# Patient Record
Sex: FEMALE | Race: BLACK OR AFRICAN AMERICAN | NOT HISPANIC OR LATINO | Employment: UNEMPLOYED | URBAN - METROPOLITAN AREA
[De-identification: names, ages, dates, MRNs, and addresses within clinical notes are randomized per-mention and may not be internally consistent; named-entity substitution may affect disease eponyms.]

---

## 2017-03-09 ENCOUNTER — LAB VISIT (OUTPATIENT)
Dept: LAB | Facility: HOSPITAL | Age: 32
End: 2017-03-09
Attending: OBSTETRICS & GYNECOLOGY
Payer: MEDICAID

## 2017-03-09 ENCOUNTER — PROCEDURE VISIT (OUTPATIENT)
Dept: OBSTETRICS AND GYNECOLOGY | Facility: CLINIC | Age: 32
End: 2017-03-09
Payer: MEDICAID

## 2017-03-09 ENCOUNTER — OFFICE VISIT (OUTPATIENT)
Dept: OBSTETRICS AND GYNECOLOGY | Facility: CLINIC | Age: 32
End: 2017-03-09
Payer: MEDICAID

## 2017-03-09 VITALS
SYSTOLIC BLOOD PRESSURE: 118 MMHG | HEIGHT: 62 IN | WEIGHT: 124.56 LBS | BODY MASS INDEX: 22.92 KG/M2 | DIASTOLIC BLOOD PRESSURE: 70 MMHG

## 2017-03-09 DIAGNOSIS — Z32.01 POSITIVE PREGNANCY TEST: Primary | ICD-10-CM

## 2017-03-09 DIAGNOSIS — Z32.01 POSITIVE PREGNANCY TEST: ICD-10-CM

## 2017-03-09 LAB
ANION GAP SERPL CALC-SCNC: 10 MMOL/L
BASOPHILS # BLD AUTO: 0.02 K/UL
BASOPHILS NFR BLD: 0.4 %
BUN SERPL-MCNC: 14 MG/DL
CALCIUM SERPL-MCNC: 8.7 MG/DL
CHLORIDE SERPL-SCNC: 100 MMOL/L
CO2 SERPL-SCNC: 26 MMOL/L
CREAT SERPL-MCNC: 0.8 MG/DL
DIFFERENTIAL METHOD: ABNORMAL
EOSINOPHIL # BLD AUTO: 0.1 K/UL
EOSINOPHIL NFR BLD: 2.5 %
ERYTHROCYTE [DISTWIDTH] IN BLOOD BY AUTOMATED COUNT: 14.4 %
EST. GFR  (AFRICAN AMERICAN): >60 ML/MIN/1.73 M^2
EST. GFR  (NON AFRICAN AMERICAN): >60 ML/MIN/1.73 M^2
GLUCOSE SERPL-MCNC: 72 MG/DL
HCT VFR BLD AUTO: 31.3 %
HGB BLD-MCNC: 10.4 G/DL
LYMPHOCYTES # BLD AUTO: 1.8 K/UL
LYMPHOCYTES NFR BLD: 37.4 %
MCH RBC QN AUTO: 29.2 PG
MCHC RBC AUTO-ENTMCNC: 33.2 %
MCV RBC AUTO: 88 FL
MONOCYTES # BLD AUTO: 0.3 K/UL
MONOCYTES NFR BLD: 5.5 %
NEUTROPHILS # BLD AUTO: 2.5 K/UL
NEUTROPHILS NFR BLD: 54 %
PLATELET # BLD AUTO: 246 K/UL
PMV BLD AUTO: 11.4 FL
POTASSIUM SERPL-SCNC: 3.9 MMOL/L
RBC # BLD AUTO: 3.56 M/UL
SODIUM SERPL-SCNC: 136 MMOL/L
WBC # BLD AUTO: 4.71 K/UL

## 2017-03-09 PROCEDURE — 76801 OB US < 14 WKS SINGLE FETUS: CPT | Mod: 26,S$PBB,, | Performed by: OBSTETRICS & GYNECOLOGY

## 2017-03-09 PROCEDURE — 99214 OFFICE O/P EST MOD 30 MIN: CPT | Mod: TH,S$PBB,25, | Performed by: OBSTETRICS & GYNECOLOGY

## 2017-03-09 PROCEDURE — 99999 PR PBB SHADOW E&M-NEW PATIENT-LVL III: CPT | Mod: PBBFAC,,, | Performed by: OBSTETRICS & GYNECOLOGY

## 2017-03-09 PROCEDURE — 76801 OB US < 14 WKS SINGLE FETUS: CPT | Mod: PBBFAC,PN | Performed by: OBSTETRICS & GYNECOLOGY

## 2017-03-09 NOTE — MR AVS SNAPSHOT
"    [facility] Obstetrics and Gynecology  [address]  [place] [address]  Phone: [phone]                  Lauren Griggs   3/9/2017 1:15 PM   Office Visit    Description:  Female : 1985   Provider:  Tiffany Garcia MD   Department:  [facility] Obstetrics and Gynecology           Reason for Visit     Possible Pregnancy           Diagnoses this Visit        Comments    Positive pregnancy test    -  Primary            To Do List           Future Appointments        Provider Department Dept Phone    3/9/2017 3:30 PM ULTRA SOUND, OB-GYN-[facility] Obstetrics and Gynecology [phone]      Goals (5 Years of Data)     None      [facility]     [facility] Nurse Care [facility] -  Assistance  Registered nurses in the [facility] On Call Center provide clinical advisement, health education, appointment booking, and other advisory services.  Call for this free service at [phone].             Medications           Message regarding Medications     Verify the changes and/or additions to your medication regime listed below are the same as discussed with your clinician today.  If any of these changes or additions are incorrect, please notify your healthcare provider.             Verify that the below list of medications is an accurate representation of the medications you are currently taking.  If none reported, the list may be blank. If incorrect, please contact your healthcare provider. Carry this list with you in case of emergency.                Clinical Reference Information           Your Vitals Were     BP Height Weight Last Period BMI    118/70 5' 2" (1.575 m) 56.5 kg (124 lb 9 oz) 2017 22.78 kg/m2      Blood Pressure          Most Recent Value    BP  118/70      Allergies as of 3/9/2017     No Known Allergies      Immunizations Administered on Date of Encounter - 3/9/2017     None      Orders Placed During Today's Visit      Normal Orders This Visit    POCT urine " pregnancy     Urine culture     Future Labs/Procedures Expected by Expires    Basic metabolic panel  3/9/2017 5/8/2018    CBC auto differential  3/9/2017 5/8/2018    Hemoglobin Electrophoresis,Hgb A2 Reynold.  3/9/2017 5/8/2018    Hepatitis B surface antigen  3/9/2017 5/8/2018    HIV-1 and HIV-2 antibodies  3/9/2017 5/8/2018    RPR  3/9/2017 5/8/2018    Rubella antibody, IgG  3/9/2017 5/8/2018    Type & Screen  3/9/2017 5/8/2018    US OB/GYN Procedure (Viewpoint)  As directed 3/9/2018      [facility] Sign-Up     Activating your [facility] account is as easy as 1-2-3!     1) Visit [email or web address], select Sign Up Now, enter this activation code and your date of birth, then select Next.  [email or web address]  Expires: 4/23/2017  2:00 PM      2) Create a username and password to use when you visit [facility] in the future and select a security question in case you lose your password and select Next.    3) Enter your e-mail address and click Sign Up!    Additional Information  If you have questions, please e-mail [email or web address] or call [phone] to talk to our [facility] staff. Remember, [facility] is NOT to be used for urgent needs. For medical emergencies, dial 911.         Language Assistance Services     ATTENTION: Language assistance services are available, free of charge. Please call [phone].      ATENCIÓN: Si habla español, tiene a dickens disposición servicios gratuitos de asistencia lingüística. Llame al [phone].     CHÚ Ý: N?u b?n nói Ti?ng Vi?t, có các d?ch v? h? tr? ngôn ng? mi?n phí dành cho b?n. G?i s? [phone].         [facility] Obstetrics and Gynecology complies with applicable Federal civil rights laws and does not discriminate on the basis of race, color, national origin, age, disability, or sex.

## 2017-03-09 NOTE — PROGRESS NOTES
Subjective:       Patient ID: Lauren Griggs is a 31 y.o. female.    Chief Complaint:  Possible Pregnancy      History of Present Illness  HPI  Missed Menses/ Possible Pregnancy  Patient complains of amenorrhea. She believes she could be pregnant. Pregnancy is desired. Sexual Activity: single partner, contraception: none. Current symptoms also include: breast tenderness, morning sickness, nausea and positive home pregnancy test. Last period was lighter than normal.     Patient's last menstrual period was 2017.     Was planning to move to [place] to study art (but canceled plans with +upt) ;; fob in active --[employer]- in [facility] -    Still working at [employer]--  Stopped zoloft/busperinone prior to conception  Hx of c/section x 2    GYN & OB History  Patient's last menstrual period was 2017.   Date of Last Pap: No result found    OB History    Para Term  AB SAB TAB Ectopic Multiple Living   2 2 2       2      # Outcome Date GA Lbr Beto/2nd Weight Sex Delivery Anes PTL Lv   2 Term     M CS-Unspec   Y   1 Term     F CS-Unspec   Y          Review of Systems  Review of Systems   Constitutional: Negative for activity change, appetite change, chills, diaphoresis, fatigue, fever and unexpected weight change.   HENT: Negative for mouth sores and tinnitus.    Eyes: Negative for discharge and visual disturbance.   Respiratory: Negative for cough, shortness of breath and wheezing.    Cardiovascular: Negative for chest pain, palpitations and leg swelling.   Gastrointestinal: Negative for abdominal pain, bloating, blood in stool, constipation, diarrhea, nausea and vomiting.   Endocrine: Negative for diabetes, hair loss, hot flashes, hyperthyroidism and hypothyroidism.   Genitourinary: Positive for menstrual problem (+upt). Negative for decreased libido, dyspareunia, dysuria, flank pain, frequency, genital sores, hematuria, menorrhagia, pelvic pain, urgency, vaginal bleeding,  vaginal discharge, vaginal pain, dysmenorrhea, urinary incontinence, postcoital bleeding, postmenopausal bleeding and vaginal odor.   Musculoskeletal: Negative for back pain and myalgias.   Skin:  Negative for rash, no acne and hair changes.   Neurological: Negative for seizures, syncope, numbness and headaches.   Hematological: Negative for adenopathy. Does not bruise/bleed easily.   Psychiatric/Behavioral: Negative for depression and sleep disturbance. The patient is not nervous/anxious.    Breast: Negative for breast mass, breast pain, nipple discharge and skin changes          Objective:    Physical Exam:   Constitutional: She appears well-developed.     Eyes: Conjunctivae and EOM are normal. Pupils are equal, round, and reactive to light.    Neck: Normal range of motion. Neck supple.     Pulmonary/Chest: Effort normal.        Abdominal: Soft.             Musculoskeletal: Normal range of motion.       Neurological: She is alert.    Skin: Skin is warm.    Psychiatric: She has a normal mood and affect.          Assessment:     Positive pregnancy test          Plan:      Risk assessment today  Labs today  Gc/ct/pap/affirm next visit  sono ordered for dating  Continue increase water and cont prenatal vit

## 2017-03-10 ENCOUNTER — TELEPHONE (OUTPATIENT)
Dept: OBSTETRICS AND GYNECOLOGY | Facility: CLINIC | Age: 32
End: 2017-03-10

## 2017-03-10 LAB
ABO + RH BLD: NORMAL
BLD GP AB SCN CELLS X3 SERPL QL: NORMAL
HBV SURFACE AG SERPL QL IA: NEGATIVE
HGB A2 MFR BLD HPLC: 2.9 %
HGB FRACT BLD ELPH-IMP: NORMAL
HGB FRACT BLD ELPH-IMP: NORMAL
HIV 1+2 AB+HIV1 P24 AG SERPL QL IA: NEGATIVE
RPR SER QL: NORMAL
RUBV IGG SER-ACNC: 6.5 IU/ML
RUBV IGG SER-IMP: ABNORMAL

## 2017-03-10 NOTE — TELEPHONE ENCOUNTER
----- Message from Fatuma Austin sent at 3/10/2017  3:56 PM CST -----  Would like to schedule next ob appt. Please call back at [phone]. Thanks//cdb

## 2017-03-11 LAB
BACTERIA UR CULT: NORMAL
BACTERIA UR CULT: NORMAL

## 2017-04-06 ENCOUNTER — INITIAL PRENATAL (OUTPATIENT)
Dept: OBSTETRICS AND GYNECOLOGY | Facility: CLINIC | Age: 32
End: 2017-04-06
Payer: MEDICAID

## 2017-04-06 ENCOUNTER — PROCEDURE VISIT (OUTPATIENT)
Dept: OBSTETRICS AND GYNECOLOGY | Facility: CLINIC | Age: 32
End: 2017-04-06
Payer: MEDICAID

## 2017-04-06 VITALS
SYSTOLIC BLOOD PRESSURE: 100 MMHG | WEIGHT: 124.56 LBS | DIASTOLIC BLOOD PRESSURE: 60 MMHG | BODY MASS INDEX: 22.78 KG/M2

## 2017-04-06 DIAGNOSIS — Z36.86 ENCOUNTER FOR SCREENING FOR RISK OF PRE-TERM LABOR: ICD-10-CM

## 2017-04-06 DIAGNOSIS — N76.0 BACTERIAL VAGINOSIS: ICD-10-CM

## 2017-04-06 DIAGNOSIS — Z34.83 ENCOUNTER FOR SUPERVISION OF OTHER NORMAL PREGNANCY IN THIRD TRIMESTER: ICD-10-CM

## 2017-04-06 DIAGNOSIS — Z12.4 SCREENING FOR CERVICAL CANCER: Primary | ICD-10-CM

## 2017-04-06 DIAGNOSIS — B96.89 BACTERIAL VAGINOSIS: ICD-10-CM

## 2017-04-06 DIAGNOSIS — O36.80X1 EXAMINATION TO DETERMINE FETAL VIABILITY OF PREGNANCY, FETUS 1: ICD-10-CM

## 2017-04-06 DIAGNOSIS — Z11.3 SCREENING FOR GONORRHEA: ICD-10-CM

## 2017-04-06 PROCEDURE — 76801 OB US < 14 WKS SINGLE FETUS: CPT | Mod: PBBFAC,PN | Performed by: OBSTETRICS & GYNECOLOGY

## 2017-04-06 PROCEDURE — 99999 PR PBB SHADOW E&M-EST. PATIENT-LVL II: CPT | Mod: PBBFAC,,, | Performed by: OBSTETRICS & GYNECOLOGY

## 2017-04-06 PROCEDURE — 88175 CYTOPATH C/V AUTO FLUID REDO: CPT

## 2017-04-06 PROCEDURE — 99213 OFFICE O/P EST LOW 20 MIN: CPT | Mod: TH,S$PBB,, | Performed by: OBSTETRICS & GYNECOLOGY

## 2017-04-06 PROCEDURE — 76801 OB US < 14 WKS SINGLE FETUS: CPT | Mod: 26,S$PBB,, | Performed by: OBSTETRICS & GYNECOLOGY

## 2017-04-06 NOTE — PROGRESS NOTES
Reviewed edc, prenatal labs  Gc/ct/affirm/pap today  Advised zyrtec, allegra, claritin daily, nasonex spray  Cervical length/quad screen next visit  Plans to resume zoloft next visit

## 2017-04-06 NOTE — MR AVS SNAPSHOT
[facility] Obstetrics and Gynecology  [address]  [place] [address]  Phone: [phone]                  Lauren Griggs   2017 7:45 AM   Initial Prenatal    Description:  Female : 1985   Provider:  Tiffany Garcia MD   Department:  [facility] Obstetrics and Gynecology           Reason for Visit     Initial Prenatal Visit           Diagnoses this Visit        Comments    Screening for cervical cancer    -  Primary     Screening for gonorrhea         Bacterial vaginosis         Encounter for supervision of other normal pregnancy in third trimester         Examination to determine fetal viability of pregnancy, fetus 1         Encounter for screening for risk of pre-term labor                To Do List           Future Appointments        Provider Department Dept Phone    2017 9:30 AM ULTRA SOUND, OB-GYN-[facility] Obstetrics and Gynecology [phone]    2017 8:30 AM ULTRA SOUND, OB-GYN-[facility] Obstetrics and Gynecology [phone]    2017 9:15 AM Magdalena Can CNM [facility] Obstetrics [facility] Gynecology [phone]      Goals (5 Years of Data)     None      Follow-Up and Disposition     Return in about 4 weeks (around 2017) for ob visit.    Follow-up and Disposition History      [facility]     [facility] On Call Nurse Care Line -  Assistance  Unless otherwise directed by your provider, please contact [facility], our nurse care line that is available for  assistance.     Registered nurses in the [facility] Center provide: appointment scheduling, clinical advisement, health education, and other advisory services.  Call: [phone] (toll free)               Medications           Message regarding Medications     Verify the changes and/or additions to your medication regime listed below are the same as discussed with your clinician today.  If any of these changes or additions are incorrect, please notify your healthcare provider.              Verify that the below list of medications is an accurate representation of the medications you are currently taking.  If none reported, the list may be blank. If incorrect, please contact your healthcare provider. Carry this list with you in case of emergency.           Current Medications     PNV with calcium no.72-iron-FA 27 mg iron- 1 mg Tab Take 1 tablet by mouth once daily.           Clinical Reference Information           Prenatal Vitals     Enc. Date GA Prenatal Vitals Prenatal Pulse Pain Level Urine Albumin/Glucose Edema Presentation Dilation/Effacement/Station    4/6/17 13w0d 100/60 / 56.5 kg (124 lb 9 oz)   0  None / None / None / No        Your Vitals Were     BP Weight Last Period BMI       100/60 56.5 kg (124 lb 9 oz) 01/14/2017 22.78 kg/m2       Allergies as of 4/6/2017     No Known Allergies      Immunizations Administered on Date of Encounter - 4/6/2017     None      Orders Placed During Today's Visit     Future Labs/Procedures Expected by Expires    US OB/GYN Procedure (Viewpoint)  As directed 4/6/2018     OB/GYN Procedure ([facility])  As directed 4/6/2018      [facility] Sign-Up     Activating your [facility] account is as easy as 1-2-3!     1) Visit [email or web address], select Sign Up Now, enter this activation code and your date of birth, then select Next.  [email or web address]  Expires: 4/23/2017  3:00 PM      2) Create a username and password to use when you visit [facility] in the future and select a security question in case you lose your password and select Next.    3) Enter your e-mail address and click Sign Up!    Additional Information  If you have questions, please e-mail [email or web address] or call [phone] to talk to our [facility] staff. Remember, [facility] is NOT to be used for urgent needs. For medical emergencies, dial 911.         Language Assistance Services     ATTENTION: Language assistance services are available, free of charge. Please call [phone].       ATENCIÓN: Si habla español, tiene a dickens disposición servicios gratuitos de asistencia lingüística. Johny al [phone].     CHÚ Ý: N?u b?n nói Ti?ng Vi?t, có các d?ch v? h? tr? ngôn ng? mi?n phí dành cho b?n. G?i s? [phone].         [facility] Obstetrics and Gynecology complies with applicable Federal civil rights laws and does not discriminate on the basis of race, color, national origin, age, disability, or sex.

## 2017-04-07 LAB
C TRACH DNA SPEC QL NAA+PROBE: NOT DETECTED
CANDIDA RRNA VAG QL PROBE: NEGATIVE
G VAGINALIS RRNA GENITAL QL PROBE: POSITIVE
N GONORRHOEA DNA SPEC QL NAA+PROBE: NOT DETECTED
T VAGINALIS RRNA GENITAL QL PROBE: NEGATIVE

## 2017-04-08 ENCOUNTER — PATIENT MESSAGE (OUTPATIENT)
Dept: OBSTETRICS AND GYNECOLOGY | Facility: CLINIC | Age: 32
End: 2017-04-08

## 2017-04-09 ENCOUNTER — PATIENT MESSAGE (OUTPATIENT)
Dept: OBSTETRICS AND GYNECOLOGY | Facility: HOSPITAL | Age: 32
End: 2017-04-09

## 2017-04-09 DIAGNOSIS — N76.0 BACTERIAL VAGINOSIS: Primary | ICD-10-CM

## 2017-04-09 DIAGNOSIS — B96.89 BACTERIAL VAGINOSIS: Primary | ICD-10-CM

## 2017-04-09 RX ORDER — METRONIDAZOLE 500 MG/1
500 TABLET ORAL EVERY 12 HOURS
Qty: 14 TABLET | Refills: 0 | Status: SHIPPED | OUTPATIENT
Start: 2017-04-09 | End: 2017-04-16

## 2017-04-11 ENCOUNTER — PATIENT MESSAGE (OUTPATIENT)
Dept: OBSTETRICS AND GYNECOLOGY | Facility: CLINIC | Age: 32
End: 2017-04-11

## 2017-04-12 ENCOUNTER — PATIENT MESSAGE (OUTPATIENT)
Dept: OBSTETRICS AND GYNECOLOGY | Facility: CLINIC | Age: 32
End: 2017-04-12

## 2017-04-13 ENCOUNTER — PATIENT MESSAGE (OUTPATIENT)
Dept: OBSTETRICS AND GYNECOLOGY | Facility: CLINIC | Age: 32
End: 2017-04-13

## 2017-05-02 ENCOUNTER — ROUTINE PRENATAL (OUTPATIENT)
Dept: OBSTETRICS AND GYNECOLOGY | Facility: CLINIC | Age: 32
End: 2017-05-02
Payer: MEDICAID

## 2017-05-02 ENCOUNTER — PROCEDURE VISIT (OUTPATIENT)
Dept: OBSTETRICS AND GYNECOLOGY | Facility: CLINIC | Age: 32
End: 2017-05-02
Payer: MEDICAID

## 2017-05-02 VITALS — WEIGHT: 125 LBS | SYSTOLIC BLOOD PRESSURE: 102 MMHG | BODY MASS INDEX: 22.86 KG/M2 | DIASTOLIC BLOOD PRESSURE: 70 MMHG

## 2017-05-02 DIAGNOSIS — Z36.89 ENCOUNTER FOR FETAL ANATOMIC SURVEY: Primary | ICD-10-CM

## 2017-05-02 DIAGNOSIS — Z98.891 PREVIOUS CESAREAN SECTION: ICD-10-CM

## 2017-05-02 DIAGNOSIS — Z36.86 ENCOUNTER FOR SCREENING FOR RISK OF PRE-TERM LABOR: ICD-10-CM

## 2017-05-02 DIAGNOSIS — N76.0 BV (BACTERIAL VAGINOSIS): ICD-10-CM

## 2017-05-02 DIAGNOSIS — D64.9 ANEMIA, UNSPECIFIED TYPE: ICD-10-CM

## 2017-05-02 DIAGNOSIS — Z34.82 ENCOUNTER FOR SUPERVISION OF OTHER NORMAL PREGNANCY IN SECOND TRIMESTER: ICD-10-CM

## 2017-05-02 DIAGNOSIS — B96.89 BV (BACTERIAL VAGINOSIS): ICD-10-CM

## 2017-05-02 PROBLEM — Z34.92 ENCOUNTER FOR SUPERVISION OF NORMAL PREGNANCY IN SECOND TRIMESTER: Status: ACTIVE | Noted: 2017-05-02

## 2017-05-02 PROCEDURE — 99212 OFFICE O/P EST SF 10 MIN: CPT | Mod: 25,PBBFAC,PN | Performed by: OBSTETRICS & GYNECOLOGY

## 2017-05-02 PROCEDURE — 99999 PR PBB SHADOW E&M-EST. PATIENT-LVL II: CPT | Mod: 25,PBBFAC,, | Performed by: OBSTETRICS & GYNECOLOGY

## 2017-05-02 PROCEDURE — 76817 TRANSVAGINAL US OBSTETRIC: CPT | Mod: 26,52,S$PBB, | Performed by: OBSTETRICS & GYNECOLOGY

## 2017-05-02 PROCEDURE — 99213 OFFICE O/P EST LOW 20 MIN: CPT | Mod: TH,S$PBB,, | Performed by: OBSTETRICS & GYNECOLOGY

## 2017-05-02 RX ORDER — METRONIDAZOLE 7.5 MG/G
1 GEL VAGINAL NIGHTLY
Qty: 70 G | Refills: 0 | Status: SHIPPED | OUTPATIENT
Start: 2017-05-02 | End: 2017-05-07

## 2017-05-02 RX ORDER — FERROUS SULFATE 325(65) MG
325 TABLET ORAL DAILY
Qty: 30 TABLET | Refills: 3 | Status: SHIPPED | OUTPATIENT
Start: 2017-05-02 | End: 2017-05-30 | Stop reason: SDUPTHER

## 2017-05-02 NOTE — LETTER
[place] - Obstetrics and Gynecology  [address]  [place] [address]  Phone: [phone]     To Whom It May Concern,    Please note that Ms. Griggs is under our care for her pregnancy. Prolonged, stationery standing can contribute to complications in pregnancy. Advised rest periods, particularly, a stool to accommodate her sitting in her drive through position would be beneficial and advised.       With Kindest Regards,           Magdalena Can, MSN, APRN-CNM

## 2017-05-02 NOTE — PROGRESS NOTES
"US today : , breech, posterior placenta, devin normal. Cerix 55 mm.  No complaints other than fatigue.  Anemia is noted on ob labs and rx sent in for ferrous sulfate to take daily 30" prior to meal with juice in addition to PNV daily.  Is for a RCS.  Anatomy scan scheduled for next ov.  Notes quickening and other common discomforts only.   BV noted on screen and metrogel sent in to pharmacy on file per pt request.   RTC 4 wks.   "

## 2017-05-02 NOTE — MR AVS SNAPSHOT
[facility] Obstetrics and Gynecology  [address]  [place] [address]  Phone: [phone]                  Lauren Griggs   2017 9:15 AM   Routine Prenatal    Description:  Female : 1985   Provider:  Magdalena Can CNM   Department:  [facility] Obstetrics and Gynecology           Reason for Visit     Routine Prenatal Visit                To Do List           Future Appointments        Provider Department Dept Phone    2017 9:30 AM ADORE VILLALTA, OB-GYN-[facility] Obstetrics and Gynecology [phone]    2017 10:00 AM Magdalena Can CNM [facility] Obstetrics and Gynecology [phone]      Goals (5 Years of Data)     None      [facility]     [facility] On Call Nurse Care Line -  Assistance  Unless otherwise directed by your provider, please contact [facility], our nurse care line that is available for  assistance.     Registered nurses in the [facility] On Call Center provide: appointment scheduling, clinical advisement, health education, and other advisory services.  Call: [phone] (toll free)               Medications           Message regarding Medications     Verify the changes and/or additions to your medication regime listed below are the same as discussed with your clinician today.  If any of these changes or additions are incorrect, please notify your healthcare provider.             Verify that the below list of medications is an accurate representation of the medications you are currently taking.  If none reported, the list may be blank. If incorrect, please contact your healthcare provider. Carry this list with you in case of emergency.           Current Medications     PNV with calcium no.72-iron-FA 27 mg iron- 1 mg Tab Take 1 tablet by mouth once daily.           Clinical Reference Information           Prenatal Vitals     Enc. Date GA Prenatal Vitals Prenatal Pulse Pain Level Urine Albumin/Glucose Edema Presentation  Dilation/Effacement/Station    5/2/17 16w5d 102/70 / 56.7 kg (125 lb)  / 152 / Present          4/6/17 13w0d 100/60 / 56.5 kg (124 lb 9 oz)   0  None / None / None / No        Your Vitals Were     BP Weight Last Period BMI       102/70 56.7 kg (125 lb) 01/14/2017 22.86 kg/m2       Allergies as of 5/2/2017     No Known Allergies      Immunizations Administered on Date of Encounter - 5/2/2017     None      Language Assistance Services     ATTENTION: Language assistance services are available, free of charge. Please call [phone].      ATENCIÓN: Si habla marck, tiene a dickens disposición servicios gratuitos de asistencia lingüística. Llame al [phone].     CHÚ Ý: N?u b?n nói Ti?ng Vi?t, có các d?ch v? h? tr? ngôn ng? mi?n phí dành cho b?n. G?i s? [phone].         [facility] Obstetrics and Gynecology complies with applicable Federal civil rights laws and does not discriminate on the basis of race, color, national origin, age, disability, or sex.

## 2017-05-26 ENCOUNTER — HOSPITAL ENCOUNTER (EMERGENCY)
Facility: HOSPITAL | Age: 32
Discharge: HOME | End: 2017-05-26
Attending: EMERGENCY MEDICINE
Payer: MEDICAID

## 2017-05-26 ENCOUNTER — TELEPHONE (OUTPATIENT)
Dept: OBSTETRICS AND GYNECOLOGY | Facility: CLINIC | Age: 32
End: 2017-05-26

## 2017-05-26 VITALS
HEIGHT: 62 IN | HEART RATE: 88 BPM | DIASTOLIC BLOOD PRESSURE: 71 MMHG | OXYGEN SATURATION: 99 % | TEMPERATURE: 98 F | BODY MASS INDEX: 23.19 KG/M2 | SYSTOLIC BLOOD PRESSURE: 102 MMHG | WEIGHT: 126 LBS | RESPIRATION RATE: 18 BRPM

## 2017-05-26 DIAGNOSIS — Z3A.19 19 WEEKS GESTATION OF PREGNANCY: Primary | ICD-10-CM

## 2017-05-26 LAB
BACTERIA #/AREA URNS HPF: NORMAL /HPF
BILIRUB UR QL STRIP: NEGATIVE
CLARITY UR: CLEAR
COLOR UR: YELLOW
GLUCOSE UR QL STRIP: NEGATIVE
HGB UR QL STRIP: ABNORMAL
KETONES UR QL STRIP: ABNORMAL
LEUKOCYTE ESTERASE UR QL STRIP: NEGATIVE
MICROSCOPIC COMMENT: NORMAL
NITRITE UR QL STRIP: NEGATIVE
PH UR STRIP: 6 [PH] (ref 5–8)
PROT UR QL STRIP: NEGATIVE
RBC #/AREA URNS HPF: 3 /HPF (ref 0–4)
SP GR UR STRIP: 1.02 (ref 1–1.03)
SQUAMOUS #/AREA URNS HPF: 3 /HPF
URN SPEC COLLECT METH UR: ABNORMAL
UROBILINOGEN UR STRIP-ACNC: NEGATIVE EU/DL
WBC #/AREA URNS HPF: 3 /HPF (ref 0–5)

## 2017-05-26 PROCEDURE — 81000 URINALYSIS NONAUTO W/SCOPE: CPT

## 2017-05-26 PROCEDURE — 99284 EMERGENCY DEPT VISIT MOD MDM: CPT

## 2017-05-26 NOTE — TELEPHONE ENCOUNTER
----- Message from Latasha Forbes sent at 5/26/2017  7:41 AM CDT -----  Contact: pt   Pt is having abdominal pain and would like to be seen today,, there is nothing available,, pt refused other providers,, please call pt back at [phone]

## 2017-05-26 NOTE — TELEPHONE ENCOUNTER
Educated Pt about round ligament pain and ways to alleviate pain associated. Pt states that she also had some spotting. I advised her to refrain from sexual intercourse and if she starts to experience a bloodflow with cramping go to L&D. DS

## 2017-05-26 NOTE — ED NOTES
Change of caregiver. Informed MD will give results of urine. Has pain lower part of abd. No bleeding/back pain

## 2017-05-26 NOTE — ED PROVIDER NOTES
SCRIBE #1 NOTE: I, Manuel Ladd, am scribing for, and in the presence of, Chuck Jamison MD. I have scribed the entire note.      History      Chief Complaint   Patient presents with    Abdominal Pain     Pt reports she is 19 weeks pregnant and is having suprapubic pain that is cramping. pt also reports spotting since this morning        Review of patient's allergies indicates:  No Known Allergies     HPI   HPI    2017, 1:07 PM   History obtained from the patient      History of Present Illness: Lauren Griggs is a 31 y.o. female patient who presents to the Emergency Department for lower abd pain which onset gradually today. Symptoms are intermittent and moderate in severity. Sx are exacerbated by nothing and relieved by nothing. Pt states the pain comes on every few seconds. Associated sxs include vaginal spotting onset this AM. No other sxs reported. Pt states she is 19 weeks pregnant. Pt states her next OB/GYN appointment is in 4 days. Pt states she tried to see her OB/GYN physician today but she was booked so she was instructed to reported to the ED. Patient denies any fever, N/V/D, chills, vaginal discharge, constipation, dysuria, difficulty urinating, CP, SOB, vaginal pain and all other sxs at this time. No further complaints or concerns at this time.     Arrival mode: Personal vehicle     PCP: Primary Doctor No       Past Medical History:  Past Medical History:   Diagnosis Date    Abnormal Pap smear of cervix        Past Surgical History:  Past Surgical History:   Procedure Laterality Date     SECTION           Family History:  No family history on file.    Social History:  Social History     Social History Main Topics    Smoking status: Never Smoker    Smokeless tobacco: Not on file    Alcohol use No      Comment: socally     Drug use: No    Sexual activity: Yes     Partners: Male       ROS   Review of Systems   Constitutional: Negative for chills and fever.   HENT: Negative  for congestion and sore throat.    Respiratory: Negative for chest tightness and shortness of breath.    Cardiovascular: Negative for chest pain.   Gastrointestinal: Positive for abdominal pain (lower). Negative for constipation, diarrhea, nausea and vomiting.   Genitourinary: Positive for vaginal bleeding (spotting). Negative for difficulty urinating and dysuria.   Musculoskeletal: Negative for back pain and neck pain.   Skin: Negative for rash.   Neurological: Negative for dizziness, numbness and headaches.   Psychiatric/Behavioral: Negative for agitation and confusion.   All other systems reviewed and are negative.      Physical Exam      Initial Vitals [05/26/17 1247]   BP Pulse Resp Temp SpO2   106/66 100 20 98.2 °F (36.8 °C) 98 %      Physical Exam  Nursing Notes and Vital Signs Reviewed.  Constitutional: Patient is in no apparent distress. Well-developed and well-nourished.  Head: Atraumatic. Normocephalic.  Eyes: PERRL. EOM intact. Conjunctivae are not pale. No scleral icterus.  ENT: Mucous membranes are moist. Oropharynx is clear and symmetric.    Neck: Supple. Full ROM. No lymphadenopathy.  Cardiovascular: Regular rate. Regular rhythm. No murmurs, rubs, or gallops. Distal pulses are 2+ and symmetric.  Pulmonary/Chest: No respiratory distress. Clear to auscultation bilaterally. No wheezing, rales, or rhonchi.  Abdominal: Soft and non-distended. Gravid uterus noted. There is no tenderness.  No rebound, guarding, or rigidity. Good bowel sounds.  Pelvic: A female chaperone was present for this examination. Nl external inspection. No lesions or abnormalities were visible on the labia majora or minora. Cervical os is closed. There is no CMT. Mild old blood noted in the vaginal vault, no active bleeding. No discharge. No adnexal tenderness. No adnexal masses.  Musculoskeletal: Moves all extremities. No obvious deformities. No edema. No calf tenderness.  Skin: Warm and dry.  Neurological:  Alert, awake, and  "appropriate.  Normal speech.  No acute focal neurological deficits are appreciated.  Psychiatric: Normal affect. Good eye contact. Appropriate in content.    ED Course    Procedures  ED Vital Signs:  Vitals:    05/26/17 1247 05/26/17 1502   BP: 106/66 102/71   Pulse: 100 88   Resp: 20 18   Temp: 98.2 °F (36.8 °C) 98.3 °F (36.8 °C)   TempSrc: Oral    SpO2: 98% 99%   Weight: 57.2 kg (126 lb)    Height: 5' 2" (1.575 m)        Abnormal Lab Results:  Labs Reviewed   URINALYSIS - Abnormal; Notable for the following:        Result Value    Ketones, UA 1+ (*)     Occult Blood UA 3+ (*)     All other components within normal limits   URINALYSIS MICROSCOPIC        All Lab Results:  Results for orders placed or performed during the hospital encounter of 05/26/17   Urinalysis   Result Value Ref Range    Specimen UA Urine, Clean Catch     Color, UA Yellow Yellow, Straw, Lauren    Appearance, UA Clear Clear    pH, UA 6.0 5.0 - 8.0    Specific Gravity, UA 1.025 1.005 - 1.030    Protein, UA Negative Negative    Glucose, UA Negative Negative    Ketones, UA 1+ (A) Negative    Bilirubin (UA) Negative Negative    Occult Blood UA 3+ (A) Negative    Nitrite, UA Negative Negative    Urobilinogen, UA Negative <2.0 EU/dL    Leukocytes, UA Negative Negative   Urinalysis Microscopic   Result Value Ref Range    RBC, UA 3 0 - 4 /hpf    WBC, UA 3 0 - 5 /hpf    Bacteria, UA Occasional None-Occ /hpf    Squam Epithel, UA 3 /hpf    Microscopic Comment SEE COMMENT               The Emergency Provider reviewed the vital signs and test results, which are outlined above.    ED Discussion     1:19 PM: Bedside US showed positive IUP, god fetal heart tones, and good fetal movement.    3:14 PM: Reassessed pt at this time. Pt is laying comfortably in ED bed and in NAD. Pt is awake, alert, and oriented. Instructed pt to f/u with her OB/GYN and take tylenol for the intermittent abd pain. Discussed with pt all pertinent ED information and results. Discussed pt " dx and plan of tx. Gave pt all f/u and return to the ED instructions. All questions and concerns were addressed at this time. Pt expresses understanding of information and instructions, and is comfortable with plan to discharge. Pt is stable for discharge.    I discussed with patient and/or family/caretaker that evaluation in the ED does not suggest any emergent or life threatening medical conditions requiring immediate intervention beyond what was provided in the ED, and I believe patient is safe for discharge.  Regardless, an unremarkable evaluation in the ED does not preclude the development or presence of a serious of life threatening condition. As such, patient was instructed to return immediately for any worsening or change in current symptoms.        ED Medication(s):  Medications - No data to display    Discharge Medication List as of 5/26/2017  3:07 PM          Follow-up Information     [facility] in 2 days.    Contact information:  [address] [facility] [address] [address] [phone]                     Medical Decision Making    Medical Decision Making:   Clinical Tests:   Lab Tests: Reviewed and Ordered           Scribe Attestation:   Scribe #1: I performed the above scribed service and the documentation accurately describes the services I performed. I attest to the accuracy of the note.    Attending:   Physician Attestation Statement for Scribe #1: I, Chuck Jamison MD, personally performed the services described in this documentation, as scribed by Manuel Ladd, in my presence, and it is both accurate and complete.          Clinical Impression       ICD-10-CM ICD-9-CM   1. 19 weeks gestation of pregnancy Z3A.19 V22.2       Disposition:   Disposition: Discharged  Condition: Stable         Chuck Jamison MD  05/26/17 6619

## 2017-05-30 ENCOUNTER — PROCEDURE VISIT (OUTPATIENT)
Dept: OBSTETRICS AND GYNECOLOGY | Facility: CLINIC | Age: 32
End: 2017-05-30
Payer: MEDICAID

## 2017-05-30 ENCOUNTER — ROUTINE PRENATAL (OUTPATIENT)
Dept: OBSTETRICS AND GYNECOLOGY | Facility: CLINIC | Age: 32
End: 2017-05-30
Payer: MEDICAID

## 2017-05-30 VITALS — DIASTOLIC BLOOD PRESSURE: 61 MMHG | SYSTOLIC BLOOD PRESSURE: 121 MMHG | WEIGHT: 127 LBS | BODY MASS INDEX: 23.23 KG/M2

## 2017-05-30 DIAGNOSIS — D25.1 FIBROIDS, INTRAMURAL: ICD-10-CM

## 2017-05-30 DIAGNOSIS — D25.9 UTERINE FIBROID IN PREGNANCY: ICD-10-CM

## 2017-05-30 DIAGNOSIS — D64.9 ANEMIA, UNSPECIFIED TYPE: ICD-10-CM

## 2017-05-30 DIAGNOSIS — O09.292 HISTORY OF FETAL ANOMALY IN PRIOR PREGNANCY, CURRENTLY PREGNANT IN SECOND TRIMESTER: ICD-10-CM

## 2017-05-30 DIAGNOSIS — Z3A.20 20 WEEKS GESTATION OF PREGNANCY: Primary | ICD-10-CM

## 2017-05-30 DIAGNOSIS — Z36.89 ENCOUNTER FOR FETAL ANATOMIC SURVEY: ICD-10-CM

## 2017-05-30 DIAGNOSIS — O34.10 UTERINE FIBROID IN PREGNANCY: ICD-10-CM

## 2017-05-30 DIAGNOSIS — D50.9 IRON DEFICIENCY ANEMIA, UNSPECIFIED IRON DEFICIENCY ANEMIA TYPE: ICD-10-CM

## 2017-05-30 PROCEDURE — 76805 OB US >/= 14 WKS SNGL FETUS: CPT | Mod: 26,S$PBB,, | Performed by: OBSTETRICS & GYNECOLOGY

## 2017-05-30 PROCEDURE — 99999 PR PBB SHADOW E&M-EST. PATIENT-LVL III: CPT | Mod: PBBFAC,,, | Performed by: OBSTETRICS & GYNECOLOGY

## 2017-05-30 PROCEDURE — 99213 OFFICE O/P EST LOW 20 MIN: CPT | Mod: PBBFAC,PN | Performed by: OBSTETRICS & GYNECOLOGY

## 2017-05-30 PROCEDURE — 99213 OFFICE O/P EST LOW 20 MIN: CPT | Mod: TH,S$PBB,, | Performed by: OBSTETRICS & GYNECOLOGY

## 2017-05-30 RX ORDER — FERROUS SULFATE 325(65) MG
325 TABLET ORAL DAILY
Qty: 30 TABLET | Refills: 3 | Status: SHIPPED | OUTPATIENT
Start: 2017-05-30 | End: 2018-04-13

## 2017-06-02 ENCOUNTER — HOSPITAL ENCOUNTER (EMERGENCY)
Facility: HOSPITAL | Age: 32
Discharge: HOME | End: 2017-06-02
Attending: EMERGENCY MEDICINE
Payer: MEDICAID

## 2017-06-02 VITALS
OXYGEN SATURATION: 98 % | WEIGHT: 127 LBS | RESPIRATION RATE: 18 BRPM | BODY MASS INDEX: 23.37 KG/M2 | TEMPERATURE: 98 F | HEART RATE: 91 BPM | HEIGHT: 62 IN | DIASTOLIC BLOOD PRESSURE: 55 MMHG | SYSTOLIC BLOOD PRESSURE: 90 MMHG

## 2017-06-02 DIAGNOSIS — R55 SYNCOPE: ICD-10-CM

## 2017-06-02 DIAGNOSIS — R55 SYNCOPE, UNSPECIFIED SYNCOPE TYPE: Primary | ICD-10-CM

## 2017-06-02 LAB
ALBUMIN SERPL BCP-MCNC: 3.6 G/DL
ALP SERPL-CCNC: 77 U/L
ALT SERPL W/O P-5'-P-CCNC: 14 U/L
ANION GAP SERPL CALC-SCNC: 14 MMOL/L
AST SERPL-CCNC: 18 U/L
BASOPHILS # BLD AUTO: 0.01 K/UL
BASOPHILS NFR BLD: 0.1 %
BILIRUB SERPL-MCNC: 0.3 MG/DL
BUN SERPL-MCNC: 10 MG/DL
CALCIUM SERPL-MCNC: 9.3 MG/DL
CHLORIDE SERPL-SCNC: 101 MMOL/L
CO2 SERPL-SCNC: 21 MMOL/L
CREAT SERPL-MCNC: 0.8 MG/DL
DIFFERENTIAL METHOD: ABNORMAL
EOSINOPHIL # BLD AUTO: 0.3 K/UL
EOSINOPHIL NFR BLD: 2.8 %
ERYTHROCYTE [DISTWIDTH] IN BLOOD BY AUTOMATED COUNT: 13.7 %
EST. GFR  (AFRICAN AMERICAN): >60 ML/MIN/1.73 M^2
EST. GFR  (NON AFRICAN AMERICAN): >60 ML/MIN/1.73 M^2
GLUCOSE SERPL-MCNC: 73 MG/DL
HCT VFR BLD AUTO: 35.4 %
HGB BLD-MCNC: 12.1 G/DL
LYMPHOCYTES # BLD AUTO: 1.6 K/UL
LYMPHOCYTES NFR BLD: 18.3 %
MCH RBC QN AUTO: 30.6 PG
MCHC RBC AUTO-ENTMCNC: 34.2 %
MCV RBC AUTO: 90 FL
MONOCYTES # BLD AUTO: 0.4 K/UL
MONOCYTES NFR BLD: 4.6 %
NEUTROPHILS # BLD AUTO: 6.6 K/UL
NEUTROPHILS NFR BLD: 74.2 %
PLATELET # BLD AUTO: 245 K/UL
PMV BLD AUTO: 11.1 FL
POTASSIUM SERPL-SCNC: 3.8 MMOL/L
PROT SERPL-MCNC: 10 G/DL
RBC # BLD AUTO: 3.95 M/UL
SODIUM SERPL-SCNC: 136 MMOL/L
WBC # BLD AUTO: 8.84 K/UL

## 2017-06-02 PROCEDURE — 93010 ELECTROCARDIOGRAM REPORT: CPT | Mod: ,,, | Performed by: INTERNAL MEDICINE

## 2017-06-02 PROCEDURE — 25000003 PHARM REV CODE 250: Performed by: NURSE PRACTITIONER

## 2017-06-02 PROCEDURE — 85025 COMPLETE CBC W/AUTO DIFF WBC: CPT

## 2017-06-02 PROCEDURE — 93005 ELECTROCARDIOGRAM TRACING: CPT

## 2017-06-02 PROCEDURE — 96360 HYDRATION IV INFUSION INIT: CPT

## 2017-06-02 PROCEDURE — 80053 COMPREHEN METABOLIC PANEL: CPT

## 2017-06-02 PROCEDURE — 99283 EMERGENCY DEPT VISIT LOW MDM: CPT | Mod: 25

## 2017-06-02 RX ORDER — SODIUM CHLORIDE 9 MG/ML
1000 INJECTION, SOLUTION INTRAVENOUS
Status: COMPLETED | OUTPATIENT
Start: 2017-06-02 | End: 2017-06-02

## 2017-06-02 RX ADMIN — SODIUM CHLORIDE 1000 ML: 0.9 INJECTION, SOLUTION INTRAVENOUS at 02:06

## 2017-06-02 NOTE — ED PROVIDER NOTES
"SCRIBE #1 NOTE: I, Manuel Ladd, am scribing for, and in the presence of, Aaron Cervantes NP. I have scribed the entire note.      History      Chief Complaint   Patient presents with    Dizziness     reports "overheated" and near syncopal episode. 5mo preg.        Review of patient's allergies indicates:  No Known Allergies     HPI   HPI    2017, 2:01 PM   History obtained from the patient      History of Present Illness: Lauren Griggs is a 31 y.o. 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  Pt states she felt generally weak and dizzy causing her to lower herself to the ground "almost passing out." Symptoms are episodic and moderate in severity. Sx are exacerbated by nothing and relieved by nothing. Pt states "she just got over heated." Pt states she is 5 months pregnant and she has been feeling the baby move since this episode occurred. Pt is denying any other sxs at this time. Patient denies any fever, N/V/D, chills, CP, SOB, palpitations, numbness, extremity weakness, abd pain, vaginal bleeding/discharge, lightheadedness and all other sxs at this time. No further complaints or concerns at this time.     Arrival mode: Personal vehicle     PCP: Primary Doctor No       Past Medical History:  Past Medical History:   Diagnosis Date    Abnormal Pap smear of cervix        Past Surgical History:  Past Surgical History:   Procedure Laterality Date     SECTION           Family History:  History reviewed. No pertinent family history.    Social History:  Social History     Social History Main Topics    Smoking status: Never Smoker    Smokeless tobacco: Not on file    Alcohol use No      Comment: socally     Drug use: No    Sexual activity: Yes     Partners: Male       ROS   Review of Systems   Constitutional: Negative for chills and fever.   HENT: Negative for congestion " and sore throat.    Respiratory: Negative for chest tightness and shortness of breath.    Cardiovascular: Negative for chest pain.   Gastrointestinal: Negative for abdominal pain, nausea and vomiting.   Musculoskeletal: Negative for back pain and neck pain.   Skin: Negative for rash.   Neurological: Positive for dizziness. Negative for numbness and headaches.        (+)near syncope   Psychiatric/Behavioral: Negative for agitation and confusion.   All other systems reviewed and are negative.      Physical Exam      Initial Vitals [06/02/17 1258]   BP Pulse Resp Temp SpO2   111/68 85 18 98.3 °F (36.8 °C) 98 %      Physical Exam  Nursing Notes and Vital Signs Reviewed.  Constitutional: Patient is in no apparent distress. Well-developed and well-nourished.  Head: Atraumatic. Normocephalic.  Eyes: PERRL. EOM intact. Conjunctivae are not pale. No scleral icterus.  ENT: Mucous membranes are moist. Oropharynx is clear and symmetric.    Neck: Supple. Full ROM. No lymphadenopathy.  Cardiovascular: Regular rate. Regular rhythm. No murmurs, rubs, or gallops. Distal pulses are 2+ and symmetric.  Pulmonary/Chest: No respiratory distress. Clear to auscultation bilaterally. No wheezing, rales, or rhonchi.  Abdominal: Soft and non-distended. Gravid uterus noted. There is no tenderness.  No rebound, guarding, or rigidity. Good bowel sounds.  Musculoskeletal: Moves all extremities. No obvious deformities. No edema. No calf tenderness.  Skin: Warm and dry.  Neurological:  Alert, awake, and appropriate.  Normal speech. Light touch sense is intact. No acute focal neurological deficits are appreciated.  Psychiatric: Normal affect. Good eye contact. Appropriate in content.    ED Course    Procedures  ED Vital Signs:  Vitals:    06/02/17 1224 06/02/17 1258 06/02/17 1422 06/02/17 1426   BP: (!) 86/62 111/68 (!) 89/62 (!) 90/55   Pulse: 85 85 88 91   Resp:  18     Temp:  98.3 °F (36.8 °C)     TempSrc:  Oral     SpO2:  98%     Weight:  57.6 kg  "(127 lb)     Height:  5' 2" (1.575 m)      06/02/17 1527   BP:    Pulse:    Resp:    Temp: 98.2 °F (36.8 °C)   TempSrc: Oral   SpO2:    Weight:    Height:        Abnormal Lab Results:  Labs Reviewed   CBC W/ AUTO DIFFERENTIAL - Abnormal; Notable for the following:        Result Value    RBC 3.95 (*)     Hematocrit 35.4 (*)     Gran% 74.2 (*)     All other components within normal limits   COMPREHENSIVE METABOLIC PANEL - Abnormal; Notable for the following:     CO2 21 (*)     Total Protein 10.0 (*)     All other components within normal limits        All Lab Results:  Results for orders placed or performed during the hospital encounter of 06/02/17   CBC auto differential   Result Value Ref Range    WBC 8.84 3.90 - 12.70 K/uL    RBC 3.95 (L) 4.00 - 5.40 M/uL    Hemoglobin 12.1 12.0 - 16.0 g/dL    Hematocrit 35.4 (L) 37.0 - 48.5 %    MCV 90 82 - 98 fL    MCH 30.6 27.0 - 31.0 pg    MCHC 34.2 32.0 - 36.0 %    RDW 13.7 11.5 - 14.5 %    Platelets 245 150 - 350 K/uL    MPV 11.1 9.2 - 12.9 fL    Gran # 6.6 1.8 - 7.7 K/uL    Lymph # 1.6 1.0 - 4.8 K/uL    Mono # 0.4 0.3 - 1.0 K/uL    Eos # 0.3 0.0 - 0.5 K/uL    Baso # 0.01 0.00 - 0.20 K/uL    Gran% 74.2 (H) 38.0 - 73.0 %    Lymph% 18.3 18.0 - 48.0 %    Mono% 4.6 4.0 - 15.0 %    Eosinophil% 2.8 0.0 - 8.0 %    Basophil% 0.1 0.0 - 1.9 %    Differential Method Automated    Comprehensive metabolic panel   Result Value Ref Range    Sodium 136 136 - 145 mmol/L    Potassium 3.8 3.5 - 5.1 mmol/L    Chloride 101 95 - 110 mmol/L    CO2 21 (L) 23 - 29 mmol/L    Glucose 73 70 - 110 mg/dL    BUN, Bld 10 6 - 20 mg/dL    Creatinine 0.8 0.5 - 1.4 mg/dL    Calcium 9.3 8.7 - 10.5 mg/dL    Total Protein 10.0 (H) 6.0 - 8.4 g/dL    Albumin 3.6 3.5 - 5.2 g/dL    Total Bilirubin 0.3 0.1 - 1.0 mg/dL    Alkaline Phosphatase 77 55 - 135 U/L    AST 18 10 - 40 U/L    ALT 14 10 - 44 U/L    Anion Gap 14 8 - 16 mmol/L    eGFR if African American >60 >60 mL/min/1.73 m^2    eGFR if non  >60 >60 " mL/min/1.73 m^2            The EKG was ordered, reviewed, and independently interpreted by the ED provider.  Interpretation time: 1409  Rate: 82 BPM  Rhythm: Sinus rhythm with marked sinus arrhythmia  Interpretation: Otherwise normal EKG. No STEMI.         The Emergency Provider reviewed the vital signs and test results, which are outlined above.    ED Discussion     3:14 PM: Reassessed pt at this time.  Pt is laying comfortably in ED bed and in NAD. Pt is awake, alert, and oriented. Discussed with pt all pertinent ED information and results. Discussed pt dx and plan of tx. Gave pt all f/u and return to the ED instructions. All questions and concerns were addressed at this time. Pt expresses understanding of information and instructions, and is comfortable with plan to discharge. Pt is stable for discharge.    3:29 PM: Per nurse, fetal heart tones 156.    Patient presents with a syncopal or near-syncopal episode. I have no suspicion that the event is secondary to an arrhythmia such as WPW, prolonged QT syndrome, or ventricular tachycardia. I have no suspicion for a seizure episode, intracranial hemorrhage, pulmonary embolus, myocardial infarction, sepsis, ectopic pregnancy, hemorrhagic shock, or hypoglycemia. Based on my evaluation, there is no emergent medical condition. Syncope precautions were discussed with the patient and/or caretaker, specifically not to swim or bathe unattended, not to operate motor vehicles or other machinery, and to avoid heights or other areas where falls may occur until cleared by primary care physician. Patient is safe for discharge.        ED Medication(s):  Medications   0.9%  NaCl infusion (0 mLs Intravenous Stopped 6/2/17 1526)       Discharge Medication List as of 6/2/2017  3:14 PM          Follow-up Information     Primary Doctor No. Schedule an appointment as soon as possible for a visit in 2 days.                   Medical Decision Making    Medical Decision Making:   Clinical  Tests:   Lab Tests: Reviewed and Ordered  Medical Tests: Reviewed and Ordered           Scribe Attestation:   Scribe #1: I performed the above scribed service and the documentation accurately describes the services I performed. I attest to the accuracy of the note.    Attending:   Physician Attestation Statement for Scribe #1: I, Aaron Cervantes NP, personally performed the services described in this documentation, as scribed by Manuel Ladd, in my presence, and it is both accurate and complete.          Clinical Impression       ICD-10-CM ICD-9-CM   1. Syncope, unspecified syncope type R55 780.2   2. Syncope R55 780.2       Disposition:   Disposition: Discharged  Condition: Stable         Aaron Cervantes NP  06/11/17 1203

## 2017-06-28 ENCOUNTER — ROUTINE PRENATAL (OUTPATIENT)
Dept: OBSTETRICS AND GYNECOLOGY | Facility: CLINIC | Age: 32
End: 2017-06-28
Payer: MEDICAID

## 2017-06-28 ENCOUNTER — OFFICE VISIT (OUTPATIENT)
Dept: OBSTETRICS AND GYNECOLOGY | Facility: CLINIC | Age: 32
End: 2017-06-28
Payer: MEDICAID

## 2017-06-28 VITALS — WEIGHT: 136 LBS | BODY MASS INDEX: 24.87 KG/M2 | SYSTOLIC BLOOD PRESSURE: 122 MMHG | DIASTOLIC BLOOD PRESSURE: 70 MMHG

## 2017-06-28 DIAGNOSIS — O09.292 HISTORY OF FETAL ANOMALY IN PRIOR PREGNANCY, CURRENTLY PREGNANT IN SECOND TRIMESTER: ICD-10-CM

## 2017-06-28 DIAGNOSIS — D82.1 DIGEORGE SYNDROME: ICD-10-CM

## 2017-06-28 DIAGNOSIS — Z34.82 ENCOUNTER FOR SUPERVISION OF OTHER NORMAL PREGNANCY IN SECOND TRIMESTER: Primary | ICD-10-CM

## 2017-06-28 PROCEDURE — 76805 OB US >/= 14 WKS SNGL FETUS: CPT | Mod: 26,S$PBB,, | Performed by: OBSTETRICS & GYNECOLOGY

## 2017-06-28 PROCEDURE — 99999 PR PBB SHADOW E&M-EST. PATIENT-LVL II: CPT | Mod: PBBFAC,,, | Performed by: ADVANCED PRACTICE MIDWIFE

## 2017-06-28 PROCEDURE — 99213 OFFICE O/P EST LOW 20 MIN: CPT | Mod: S$PBB,TH,25, | Performed by: OBSTETRICS & GYNECOLOGY

## 2017-06-28 PROCEDURE — 99212 OFFICE O/P EST SF 10 MIN: CPT | Mod: PBBFAC,PO | Performed by: ADVANCED PRACTICE MIDWIFE

## 2017-06-28 NOTE — PROGRESS NOTES
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 [id number] 24w6d EGA    PMH:  Past Medical History:   Diagnosis Date    Abnormal Pap smear of cervix        PObHx:  OB History    Para Term  AB Living   3 2 2     2   SAB TAB Ectopic Multiple Live Births           2      # Outcome Date GA Lbr Beto/2nd Weight Sex Delivery Anes PTL Lv   3 Current            2 Term     M CS-Unspec   [place]   1 Term     F CS-Unspec   [place]          PSH:  Past Surgical History:   Procedure Laterality Date     SECTION         Family history: Son with Di Herman syndrome    Social history: reports that she has never smoked. She does not have any smokeless tobacco history on file. She reports that she does not drink alcohol or use drugs.    A detailed fetal anatomical ultrasound was completed today.  See details in imaging section of [facility].    Interestingly, in obtaining a history from the subject, she knows her child has DiGeorge syndrome and states she is a carrier but does not know of what.  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  Because of this 50% of her children will inherit the condition.  She was informed of this.      On the US today we do see a cardiac defect suspicious for a conotruncal defect.  This is highly suggestive that this fetus has inherited the condition.        DiGeorge syndrome, also known as velocardiofacial syndrome (VCFS) is caused by a deletion at chromosome 22q11.2. Affected individuals can have a range of findings including congenital heart disease (74% of individuals), particularly conotruncal malformations (tetralogy of Fallot, interrupted aortic arch, ventricular septal defect, and truncus arteriosis); palatal abnormalities (69%), characteristic facial features; learning difficulties (70-90%); immune deficiency, hypocalcemia; feeding difficulty, renal anomalies, hearing loss, growth hormone deficiency, autoimmune disorders, seizures  and skeletal abnormalities. We discussed that the majority of affected individuals (93%) represent de brittany mutations, however, the remaining 7% have inherited the deletion from a parent. Parents are often very mildly affected and may have never been previously diagnosed. We discussed that if either she or the baby's father has the deletion, the risk for them to have another affected child is 50%. If neither of them have the deletion, the risk for them to have an  affected child is the same as the population risk of approximately 1/4,000. She verbalized understanding.    The approximate physician face-to-face time was 15 minutes. The majority of the time (>50%) was spent on counseling of the patient or coordination of care.    We have scheduled her for MFM consult, US and fetal echocardiogram at [facility] in the near future.

## 2017-06-29 NOTE — PROGRESS NOTES
US reviewed- FU [facility] Echocardiogram and US at [facility]- pt to be called to arranged  28 week lab and visit in 2 weeks in [place]    Coffective counseling sheet Fall In Love discussed with mother. Reinforced immediate skin to skin, the magic first hour, importance of the first feeding and delaying routine procedures. Encouraged mother to download Coffective mobile hailee if she has not already done so. Mother verbalizes understanding.    Coffective counseling sheet Get Ready discussed with mother. Reinforced avoiding induction of labor unless medically indicated as well as comfort measures during labor.  Encouraged mother to download Coffective mobile hailee if she has not already done so. Mother verbalizes understanding.

## 2017-07-05 ENCOUNTER — PATIENT MESSAGE (OUTPATIENT)
Dept: OBSTETRICS AND GYNECOLOGY | Facility: CLINIC | Age: 32
End: 2017-07-05

## 2017-07-05 ENCOUNTER — TELEPHONE (OUTPATIENT)
Dept: OBSTETRICS AND GYNECOLOGY | Facility: CLINIC | Age: 32
End: 2017-07-05

## 2017-07-05 DIAGNOSIS — O35.9XX1 FETAL ABNORMALITY AFFECTING MANAGEMENT OF MOTHER, FETUS 1: Primary | ICD-10-CM

## 2017-07-05 NOTE — TELEPHONE ENCOUNTER
----- Message from Elizabeth Feliciano sent at 7/5/2017  9:52 AM CDT -----  Contact: [phone]  Patient returning call. Please call back at [phone].      Thanks,  Elizabeth Feliciano

## 2017-07-07 ENCOUNTER — CLINICAL SUPPORT (OUTPATIENT)
Dept: PEDIATRIC CARDIOLOGY | Facility: CLINIC | Age: 32
End: 2017-07-07
Attending: PEDIATRICS
Payer: MEDICAID

## 2017-07-07 ENCOUNTER — OFFICE VISIT (OUTPATIENT)
Dept: MATERNAL FETAL MEDICINE | Facility: CLINIC | Age: 32
End: 2017-07-07
Attending: OBSTETRICS & GYNECOLOGY
Payer: MEDICAID

## 2017-07-07 VITALS
WEIGHT: 139.31 LBS | DIASTOLIC BLOOD PRESSURE: 68 MMHG | SYSTOLIC BLOOD PRESSURE: 104 MMHG | HEART RATE: 100 BPM | HEIGHT: 62 IN | BODY MASS INDEX: 25.64 KG/M2

## 2017-07-07 DIAGNOSIS — Q20.0 TRUNCUS ARTERIOSUS, EDWARDS' TYPE II: ICD-10-CM

## 2017-07-07 DIAGNOSIS — O35.9XX1 FETAL ABNORMALITY AFFECTING MANAGEMENT OF MOTHER, FETUS 1: ICD-10-CM

## 2017-07-07 DIAGNOSIS — D82.1 DIGEORGE SYNDROME: Primary | ICD-10-CM

## 2017-07-07 PROCEDURE — 99203 OFFICE O/P NEW LOW 30 MIN: CPT | Mod: 25,S$PBB,, | Performed by: PEDIATRICS

## 2017-07-07 PROCEDURE — 76827 ECHO EXAM OF FETAL HEART: CPT | Mod: PBBFAC | Performed by: PEDIATRICS

## 2017-07-07 PROCEDURE — 99499 UNLISTED E&M SERVICE: CPT | Mod: S$PBB,,, | Performed by: OBSTETRICS & GYNECOLOGY

## 2017-07-07 PROCEDURE — 99999 PR PBB SHADOW E&M-EST. PATIENT-LVL III: CPT | Mod: PBBFAC,,, | Performed by: PEDIATRICS

## 2017-07-07 PROCEDURE — 93325 DOPPLER ECHO COLOR FLOW MAPG: CPT | Mod: 26,S$PBB,, | Performed by: PEDIATRICS

## 2017-07-07 PROCEDURE — 76825 ECHO EXAM OF FETAL HEART: CPT | Mod: 26,S$PBB,, | Performed by: PEDIATRICS

## 2017-07-07 PROCEDURE — 76827 ECHO EXAM OF FETAL HEART: CPT | Mod: 26,S$PBB,, | Performed by: PEDIATRICS

## 2017-07-07 PROCEDURE — 93325 DOPPLER ECHO COLOR FLOW MAPG: CPT | Mod: PBBFAC | Performed by: PEDIATRICS

## 2017-07-07 PROCEDURE — 76825 ECHO EXAM OF FETAL HEART: CPT | Mod: PBBFAC | Performed by: PEDIATRICS

## 2017-07-07 NOTE — LETTER
July 11, 2017      Mukesh Stein MD  [address]  [facility] [address]           [facility] - Pediatric Cardiology  [address]  [facility] [address]  Phone: [phone]  Fax: [phone]          Patient: Lauren Griggs   MR Number: [id number]   YOB: 1985   Date of Visit: 7/7/2017       Dear Dr. Mukesh Stein:    Thank you for referring Lauren Griggs to me for evaluation. Attached you will find relevant portions of my assessment and plan of care.    If you have questions, please do not hesitate to call me. I look forward to following Lauren Griggs along with you.    Sincerely,    Jackie Phillips MD    Enclosure  CC:  No Recipients    If you would like to receive this communication electronically, please contact [email or web address] or [phone] to request more information on [email or web address] Link access.    For providers and/or their staff who would like to refer a patient to [facility], please contact us through our one-stop-shop provider referral line, [facility], at [phone].    If you feel you have received this communication in error or would no longer like to receive these types of communications, please e-mail [email or web address]

## 2017-07-07 NOTE — LETTER
July 8, 2017      Magdalena Can CNM  [address]  [place] [address]           [facility]  [address]  [place] [address]  Phone: [phone]          Patient: Lauren Griggs   MR Number: [id number]   YOB: 1985   Date of Visit: 7/7/2017       Dear Magdalena Can:    Thank you for referring Lauren Griggs to me for evaluation. Attached you will find relevant portions of my assessment and plan of care.    If you have questions, please do not hesitate to call me. I look forward to following Lauren Griggs along with you.    Sincerely,    Mukesh Stein MD    Enclosure  CC:  No Recipients    If you would like to receive this communication electronically, please contact [email or web address] or [phone] to request more information on [employer] Link access.    For providers and/or their staff who would like to refer a patient to [facility], please contact us through our one-stop-shop provider referral line, [facility], at [phone].    If you feel you have received this communication in error or would no longer like to receive these types of communications, please e-mail [email or web address]

## 2017-07-08 NOTE — PROGRESS NOTES
Fetal echocardiogram documents a truncus abnormality.    Discussed in presence of Dr. Phillips.    F/U 1 month

## 2017-07-11 PROBLEM — Q20.0: Status: ACTIVE | Noted: 2017-07-11

## 2017-07-11 NOTE — PROGRESS NOTES
"Ms. Griggs  is a 31 y.o. year old  , referred by  because of suspected fetal conotruncal defect.    The patient presented at approximately 26 1/7 weeks gestation (Patient's last menstrual period was 2017.).  The patient denied any complaints.    Past medical history: Unremarkable.  Past surgical history: S/P C/S x 1.  Past gestational history: The patient has an 11-year-old daughter and a 5-year-old son (each from a different partner).     Family history: The 5-year-old son (Alfredito Griggs, MR# [id number]) is diagnosed with DiGeorge syndrome and is S/P reimplantation of anomalous right pulmonary artery.    Medications:   Outpatient Encounter Prescriptions as of 2017   Medication Sig Dispense Refill    ferrous sulfate 325 mg (65 mg iron) Tab tablet Take 1 tablet (325 mg total) by mouth once daily. 30 tablet 3    PNV with calcium no.72-iron-FA 27 mg iron- 1 mg Tab Take 1 tablet by mouth once daily.       No facility-administered encounter medications on file as of 2017.        Allergies: Review of patient's allergies indicates no known allergies.    Blood pressure 104/68, pulse 100, height 5' 2" (1.575 m), weight 63.2 kg (139 lb 5.3 oz), last menstrual period 2017.    Fetal echocardiogram revealed a four chamber fetal heart with situs solitus.  The ventricles appeared to be equal in size.  The contractility of both ventricles was good.  The fetal heart rate was within the normal range, and regular.  There was a large anteriorly malaligned VSD seen.  There appeared to be truncus arteriosus with dysplastic, thickened (bicuspid?) truncal valve and separate origin of the pulmonary arteries. The aortic arch was well visualized, and appeared to be widely patent.  There was no pleural or pericardial effusion seen.    Doppler analysis revealed a three vessel umbilical cord, with normal flow patterns, and velocities by Doppler.  There was a normal flow pattern seen in the ductus venosus.  " There was evidence of normal systemic, and pulmonary venous return seen.  There was a normal right to left shunt seen across the foramen ovale.  There were normal inflow patterns seen across the AV-valves, without significant insufficiency.  There was a bidirectional ventricular level shunt seen.  The truncal valve appeared to be unobstructed.  There was mild truncal valve insufficiency seen.  The aortic arch appeared to be unobstructed.    Impression:  It is our impression that Ms. 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 We discussed our findings with the patient, reviewed our images, and answered her questions.  Although the patient has met with a  in the past and was aware that she was carrying the DiGeorge deletion, she was surprised to hear that her child has a significant heart defect and may also have DiGeorge syndrome.  We discussed our findings with Dr. Stein.  We scheduled follow up in our clinic in approximately three weeks for repeat imaging and continued counseling (at the same day as [facility] follow up), but, of course, we will always be available to reevaluate this patient sooner, if needed.  Time spent: 30 minutes, 50% dedicated to counseling.

## 2017-07-13 ENCOUNTER — LAB VISIT (OUTPATIENT)
Dept: LAB | Facility: HOSPITAL | Age: 32
End: 2017-07-13
Attending: ADVANCED PRACTICE MIDWIFE
Payer: MEDICAID

## 2017-07-13 ENCOUNTER — ROUTINE PRENATAL (OUTPATIENT)
Dept: OBSTETRICS AND GYNECOLOGY | Facility: CLINIC | Age: 32
End: 2017-07-13
Payer: MEDICAID

## 2017-07-13 VITALS
WEIGHT: 141.56 LBS | SYSTOLIC BLOOD PRESSURE: 116 MMHG | BODY MASS INDEX: 25.89 KG/M2 | DIASTOLIC BLOOD PRESSURE: 72 MMHG

## 2017-07-13 DIAGNOSIS — Z34.82 ENCOUNTER FOR SUPERVISION OF OTHER NORMAL PREGNANCY IN SECOND TRIMESTER: ICD-10-CM

## 2017-07-13 DIAGNOSIS — D82.1 DIGEORGE SYNDROME: ICD-10-CM

## 2017-07-13 DIAGNOSIS — O34.219 PREVIOUS CESAREAN DELIVERY AFFECTING PREGNANCY, ANTEPARTUM: Primary | ICD-10-CM

## 2017-07-13 LAB
BASOPHILS # BLD AUTO: 0.02 K/UL
BASOPHILS NFR BLD: 0.3 %
DIFFERENTIAL METHOD: ABNORMAL
EOSINOPHIL # BLD AUTO: 0.3 K/UL
EOSINOPHIL NFR BLD: 4.7 %
ERYTHROCYTE [DISTWIDTH] IN BLOOD BY AUTOMATED COUNT: 14.1 %
GLUCOSE SERPL-MCNC: 165 MG/DL
HCT VFR BLD AUTO: 33.7 %
HGB BLD-MCNC: 10.8 G/DL
LYMPHOCYTES # BLD AUTO: 1.2 K/UL
LYMPHOCYTES NFR BLD: 20.5 %
MCH RBC QN AUTO: 29.1 PG
MCHC RBC AUTO-ENTMCNC: 32 %
MCV RBC AUTO: 91 FL
MONOCYTES # BLD AUTO: 0.3 K/UL
MONOCYTES NFR BLD: 4.4 %
NEUTROPHILS # BLD AUTO: 4.2 K/UL
NEUTROPHILS NFR BLD: 69.9 %
PLATELET # BLD AUTO: 209 K/UL
PMV BLD AUTO: 12 FL
RBC # BLD AUTO: 3.71 M/UL
WBC # BLD AUTO: 5.96 K/UL

## 2017-07-13 PROCEDURE — 99999 PR PBB SHADOW E&M-EST. PATIENT-LVL II: CPT | Mod: PBBFAC,,, | Performed by: OBSTETRICS & GYNECOLOGY

## 2017-07-13 PROCEDURE — 99212 OFFICE O/P EST SF 10 MIN: CPT | Mod: PBBFAC,PN | Performed by: OBSTETRICS & GYNECOLOGY

## 2017-07-13 PROCEDURE — 99213 OFFICE O/P EST LOW 20 MIN: CPT | Mod: TH,S$PBB,, | Performed by: OBSTETRICS & GYNECOLOGY

## 2017-07-13 NOTE — PROGRESS NOTES
"+fetal movement, no srom, no vag bleeding  Cbc,glucola/hiv/rpr testing today  Has f/u with mfm and pedi cardiology (noted heart defect)  Still wants repeat c/section ; wants tubal ligation  Sign consents next visit;   +fetal movement, no srom, no vag bleeding; reviewed ptl signs/symptoms  coffective "learn your baby" reviewed  "

## 2017-07-14 ENCOUNTER — PATIENT MESSAGE (OUTPATIENT)
Dept: OBSTETRICS AND GYNECOLOGY | Facility: CLINIC | Age: 32
End: 2017-07-14

## 2017-07-14 LAB
HIV 1+2 AB+HIV1 P24 AG SERPL QL IA: NEGATIVE
RPR SER QL: NORMAL

## 2017-07-17 ENCOUNTER — TELEPHONE (OUTPATIENT)
Dept: OBSTETRICS AND GYNECOLOGY | Facility: CLINIC | Age: 32
End: 2017-07-17

## 2017-07-17 DIAGNOSIS — R73.09 ABNORMAL GLUCOSE TOLERANCE TEST: Primary | ICD-10-CM

## 2017-07-19 DIAGNOSIS — O99.810 GLUCOSE INTOLERANCE OF PREGNANCY: Primary | ICD-10-CM

## 2017-07-27 ENCOUNTER — LAB VISIT (OUTPATIENT)
Dept: LAB | Facility: HOSPITAL | Age: 32
End: 2017-07-27
Attending: ADVANCED PRACTICE MIDWIFE
Payer: MEDICAID

## 2017-07-27 DIAGNOSIS — O99.810 GLUCOSE INTOLERANCE OF PREGNANCY: ICD-10-CM

## 2017-07-27 LAB
GLUCOSE SERPL-MCNC: 172 MG/DL
GLUCOSE SERPL-MCNC: 188 MG/DL
GLUCOSE SERPL-MCNC: 75 MG/DL
GLUCOSE SERPL-MCNC: 96 MG/DL

## 2017-07-27 PROCEDURE — 82951 GLUCOSE TOLERANCE TEST (GTT): CPT

## 2017-07-27 PROCEDURE — 36415 COLL VENOUS BLD VENIPUNCTURE: CPT | Mod: PO

## 2017-07-31 ENCOUNTER — OFFICE VISIT (OUTPATIENT)
Dept: PEDIATRIC CARDIOLOGY | Facility: CLINIC | Age: 32
End: 2017-07-31
Attending: PEDIATRICS
Payer: MEDICAID

## 2017-07-31 ENCOUNTER — CLINICAL SUPPORT (OUTPATIENT)
Dept: PEDIATRIC CARDIOLOGY | Facility: CLINIC | Age: 32
End: 2017-07-31
Payer: MEDICAID

## 2017-07-31 ENCOUNTER — OFFICE VISIT (OUTPATIENT)
Dept: MATERNAL FETAL MEDICINE | Facility: CLINIC | Age: 32
End: 2017-07-31
Payer: MEDICAID

## 2017-07-31 VITALS
WEIGHT: 140.19 LBS | SYSTOLIC BLOOD PRESSURE: 108 MMHG | BODY MASS INDEX: 25.65 KG/M2 | DIASTOLIC BLOOD PRESSURE: 70 MMHG

## 2017-07-31 DIAGNOSIS — O35.BXX1 PREGNANCY COMPLICATED BY FETAL CONGENITAL HEART DISEASE, FETUS 1: ICD-10-CM

## 2017-07-31 DIAGNOSIS — Z36.89 ENCOUNTER FOR ULTRASOUND TO CHECK FETAL GROWTH: Primary | ICD-10-CM

## 2017-07-31 DIAGNOSIS — D82.1 DIGEORGE SYNDROME: ICD-10-CM

## 2017-07-31 DIAGNOSIS — Q20.0 TRUNCUS ARTERIOSUS, EDWARDS' TYPE II: ICD-10-CM

## 2017-07-31 DIAGNOSIS — Q20.0 TRUNCUS ARTERIOSUS, EDWARDS' TYPE II: Primary | ICD-10-CM

## 2017-07-31 DIAGNOSIS — Z36.89 ENCOUNTER FOR FETAL ANATOMIC SURVEY: ICD-10-CM

## 2017-07-31 PROCEDURE — 76825 ECHO EXAM OF FETAL HEART: CPT | Mod: 26,S$PBB,, | Performed by: PEDIATRICS

## 2017-07-31 PROCEDURE — 93325 DOPPLER ECHO COLOR FLOW MAPG: CPT | Mod: 26,S$PBB,, | Performed by: PEDIATRICS

## 2017-07-31 PROCEDURE — 99999 PR PBB SHADOW E&M-EST. PATIENT-LVL II: CPT | Mod: PBBFAC,,, | Performed by: OBSTETRICS & GYNECOLOGY

## 2017-07-31 PROCEDURE — 76816 OB US FOLLOW-UP PER FETUS: CPT | Mod: 26,S$PBB,, | Performed by: OBSTETRICS & GYNECOLOGY

## 2017-07-31 PROCEDURE — 76827 ECHO EXAM OF FETAL HEART: CPT | Mod: 26,S$PBB,, | Performed by: PEDIATRICS

## 2017-07-31 PROCEDURE — 3008F BODY MASS INDEX DOCD: CPT | Mod: ,,, | Performed by: PEDIATRICS

## 2017-07-31 PROCEDURE — 99211 OFF/OP EST MAY X REQ PHY/QHP: CPT | Mod: PBBFAC,25,27 | Performed by: PEDIATRICS

## 2017-07-31 PROCEDURE — 99214 OFFICE O/P EST MOD 30 MIN: CPT | Mod: 25,S$PBB,, | Performed by: PEDIATRICS

## 2017-07-31 PROCEDURE — 99213 OFFICE O/P EST LOW 20 MIN: CPT | Mod: 25,TH,S$PBB, | Performed by: OBSTETRICS & GYNECOLOGY

## 2017-07-31 PROCEDURE — 99999 PR PBB SHADOW E&M-EST. PATIENT-LVL I: CPT | Mod: PBBFAC,,, | Performed by: PEDIATRICS

## 2017-07-31 NOTE — LETTER
August 3, 2017      Americo Jung MD  [address] [address]           [facility] - Pediatric Cardiology  [address]  [facility] [address]  Phone: [phone]  Fax: [phone]          Patient: Lauren Griggs   MR Number: [id number]   YOB: 1985   Date of Visit: 7/31/2017       Dear Dr. Americo Jung:    Thank you for referring Lauren Griggs to me for evaluation. Attached you will find relevant portions of my assessment and plan of care.    If you have questions, please do not hesitate to call me. I look forward to following Lauren Griggs along with you.    Sincerely,    Jackie Phillips MD    Enclosure  CC:  No Recipients    If you would like to receive this communication electronically, please contact [email or web address] or [phone] to request more information on [facility] Link access.    For providers and/or their staff who would like to refer a patient to [facility], please contact us through our one-stop-shop provider referral line, [facility], at [phone].    If you feel you have received this communication in error or would no longer like to receive these types of communications, please e-mail [email or web address]

## 2017-07-31 NOTE — LETTER
July 31, 2017      Tiffany Garcia MD  [address] [facility] [address]           [facility] - Maternal Fetal Med  [address]  [facility] [address]  Phone: [phone]          Patient: Lauren Griggs   MR Number: [id number]   YOB: 1985   Date of Visit: 7/31/2017       Dear Dr. Tiffany Garcia:    Thank you for referring Lauren Griggs to me for evaluation. Attached you will find relevant portions of my assessment and plan of care.    If you have questions, please do not hesitate to call me. I look forward to following Lauren Griggs along with you.    Sincerely,    Americo Jung MD    Enclosure  CC:  No Recipients    If you would like to receive this communication electronically, please contact [email or web address] or [phone] to request more information on [facility] Link access.    For providers and/or their staff who would like to refer a patient to [facility], please contact us through our one-stop-shop provider referral line, [facility], at [phone].    If you feel you have received this communication in error or would no longer like to receive these types of communications, please e-mail [email or web address]

## 2017-07-31 NOTE — PROGRESS NOTES
[facility] Follow-up    Indication  ========    Consult Evaluation of fetal growth/Fetus with Truncus Abnormality .    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    Pregnancy  =========    Mcadams pregnancy. Number of fetuses: 1.    Dating  ======    LMP on: 1/14/2017  Cycle: regular cycle  GA by LMP 28 w + 2 d  NABIL by LMP: 10/21/2017  Ultrasound examination on: 7/31/2017  GA by U/S based upon: AC, BPD, Femur, HC  GA by U/S 29 w + 3 d  NABIL by U/S: 10/13/2017  Assigned: Dating performed on 03/9/2017, based on ultrasound (CRL)  Assigned GA 29 w + 4 d  Assigned NABIL: 10/12/2017    General Evaluation  ==============    Cardiac activity: present.  bpm.  Fetal movements: visualized.  Presentation: cephalic.  Placenta:  Placental site: posterior.  Umbilical cord: Cord vessels: 3 vessel cord.  Amniotic fluid: MVP 7.5 cm.    Fetal Biometry  ============    Fetal Biometry  BPD 75.4 mm 30w 2d Hadlock  OFD 94.1 mm 30w 3d Aletha  .1 mm 29w 3d Hadlock  .3 mm 28w 5d Hadlock  Femur 55.5 mm 29w 2d Hadlock  EFW 1,327 g 31% Matthew  Calculated by: Hadlock (BPD-HC-AC-FL)  EFW (lb) 2 lb  EFW (oz) 15 oz  Cephalic index 0.80  HC / AC 1.11  FL / BPD 0.74  FL / AC 0.23  MVP 7.5 cm   bpm    Fetal Anatomy  ============    Cranium: normal  Cavum septi pellucidi: normal  Posterior fossa: normal  Lips: normal  Profile: normal  Nose: normal  4-chamber view: abnormal  Heart / Thorax: Fetal Echo today  4-chamber view: truncus arteriosus  Diaphragm: normal  Stomach: normal  Kidneys: normal  Bladder: normal  Arms: documented previously  Legs: documented previously  Gender: male  Wants to know gender: yes  Other: A full anatomy survey previously performed.    Consultation  ==========    Lauren returns today for follow-up. She is currently at 29 weeks and 4 days and her fetus has been diagnosed with a truncus arteriosus. Her  5-year-old  son was born with an anomalous right pulmonary artery which required reimplantation. That was his only cardiac surgery. He also  required a G-tube. He was diagnosed with DiGeorge syndrome. She reports he is now doing well. She had an array study done in  when  he was born but I cannot access those results in the computer. 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 There is no other family history of individuals with DiGeorge syndrome. She denies any personal history of cardiac  lesions. There is some history of learning difficulties.    She denies any significant problems since her last visit. She reports good fetal movement. She denies any signs or symptoms of   labor.    We performed a follow-up ultrasound evaluation today. Fetal growth is at the 31st percentile. The amniotic fluid volume is normal. We did not  repeat all of the anatomic views but we do continue to see evidence of what appears to be a truncus arteriosus. There was no evidence of  hydrops or any abnormal fluid accumulation.    I overall spent approximately 15-20 minutes in face-to-face time with Lauren and her sister greater than 50% of which were spent in counseling  time regarding today's ultrasound findings and in discussion of further pregnancy management. I explained to her again the fact that she now  has 2 children with current a truncal defects in this current child, although we do not have chromosome evidence, most likely also has the 22  every deletion. I explained the variation in the phenotype with regard to DiGeorge syndrome including cardiac defects, palatal involvement,  learning disabilities, and psychiatric disorders. I explained that we certainly could do diagnostic testing at this point in pregnancy to confirm it  or it is something that could be obtained postnatally. I also discussed with her the potential for hypocalcemia and infection related  complications  due to thymic abnormalities with DiGeorge syndrome. Given all of these issues, I explained to her that we want this pregnancy  to progress as far as possible. Given the cardiac lesion in this fetus, I also explained to her that we will want her to deliver here in close  proximity to the cardiac surgeons. I explained to her that we will discuss that further as pregnancy advances.    At this point we will plan to see her again in approximately 4 weeks. Thank you for allowing us to participate in her care. If we can be of any  further assistance or if any issues arise between now and her return visit, please do not hesitate to call.    Impression  =========    Fetal size is AGA with the EFW at the 31st percentile.  AFV is normal.  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    Recommendation  ==============    We recommend repeat evaluation for fetal growth assessment in 5 weeks.  Plan delivery at [facility] at 39 weeks with repeat  with BTL.      Americo Jung Jr., MD  Maternal Fetal Medicine

## 2017-08-03 NOTE — PROGRESS NOTES
Ms. Griggs  is a 31 y.o. year old  , referred by  because of suspected fetal conotruncal defect.  This patient was first seen in our clinic on 2017.  Evaluation at that time resulted in the  impression that Ms. 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  She returned today for scheduled follow up.    The patient presented at approximately 29 4/7 weeks gestation (Patient's last menstrual period was 2017.).  The patient denied any complaints.    Past medical history: Unremarkable.  Past surgical history: S/P C/S x 1.  Past gestational history: The patient has an 11-year-old daughter and a 5-year-old son (each from a different partner).     Family history: The 5-year-old son (Alfredito Griggs, MR# [id number]) is diagnosed with DiGeorge syndrome and is S/P reimplantation of anomalous right pulmonary artery.    Medications:   Outpatient Encounter Prescriptions as of 2017   Medication Sig Dispense Refill    ferrous sulfate 325 mg (65 mg iron) Tab tablet Take 1 tablet (325 mg total) by mouth once daily. 30 tablet 3    PNV with calcium no.72-iron-FA 27 mg iron- 1 mg Tab Take 1 tablet by mouth once daily.       No facility-administered encounter medications on file as of 2017.        Allergies: Review of patient's allergies indicates no known allergies.    Last menstrual period 2017.    Fetal echocardiogram revealed a four chamber fetal heart with situs solitus.  The ventricles appeared to be equal in size.  The contractility of both ventricles was good.  The fetal heart rate was within the normal range, and regular.  There was a large anteriorly malaligned VSD seen.  There appeared to be truncus arteriosus with dysplastic, thickened (bicuspid?) truncal valve and separate origin of the pulmonary arteries. The aortic arch was well visualized, and appeared to be widely patent.  There was no pleural or pericardial effusion  seen.    Doppler analysis revealed a three vessel umbilical cord, with normal flow patterns, and velocities by Doppler.  There was a normal flow pattern seen in the ductus venosus.  There was evidence of normal systemic, and pulmonary venous return seen.  There was a normal right to left shunt seen across the foramen ovale.  There were normal inflow patterns seen across the AV-valves, without significant insufficiency.  There was a bidirectional ventricular level shunt seen.  The truncal valve appeared to be unobstructed.  There was mild truncal valve insufficiency seen.  The aortic arch appeared to be unobstructed.    Impression:  It is again our impression that Ms. 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 As discussed after the initial visit, it is likely that this child will also have DiGeorge syndrome.  We scheduled follow up in our clinic in approximately four weeks for repeat imaging and continued counseling (at the same day as [facility] follow up), but, of course, we will always be available to reevaluate this patient sooner, if needed.  Time spent: 30 minutes, 50% dedicated to counseling.

## 2017-08-06 ENCOUNTER — PATIENT MESSAGE (OUTPATIENT)
Dept: OBSTETRICS AND GYNECOLOGY | Facility: CLINIC | Age: 32
End: 2017-08-06

## 2017-08-08 ENCOUNTER — TELEPHONE (OUTPATIENT)
Dept: GENETICS | Facility: CLINIC | Age: 32
End: 2017-08-08

## 2017-08-08 NOTE — TELEPHONE ENCOUNTER
----- Message from Roni Romero MD sent at 8/4/2017 11:35 PM CDT -----  Contact: Makenna lozano/ Maternal Fetal ext [phone]  Emailed you  ----- Message -----  From: Dario Jalloh MA  Sent: 8/4/2017   1:49 PM  To: Roni Romero MD    Do you still have this pt's results?  ----- Message -----  From: Ansley Garcia  Sent: 8/4/2017   9:45 AM  To: Heather GUZMAN Staff    Calling to get a request Combimatrix oligorray from 2012. Please call to advise --------- Makenna lozano/ Maternal Fetal ext [phone]

## 2017-08-14 ENCOUNTER — TELEPHONE (OUTPATIENT)
Dept: OBSTETRICS AND GYNECOLOGY | Facility: CLINIC | Age: 32
End: 2017-08-14

## 2017-08-14 ENCOUNTER — ROUTINE PRENATAL (OUTPATIENT)
Dept: OBSTETRICS AND GYNECOLOGY | Facility: CLINIC | Age: 32
End: 2017-08-14
Payer: MEDICAID

## 2017-08-14 VITALS — WEIGHT: 147.69 LBS | SYSTOLIC BLOOD PRESSURE: 94 MMHG | BODY MASS INDEX: 27.02 KG/M2 | DIASTOLIC BLOOD PRESSURE: 68 MMHG

## 2017-08-14 DIAGNOSIS — O34.219 PREVIOUS CESAREAN DELIVERY AFFECTING PREGNANCY, ANTEPARTUM: ICD-10-CM

## 2017-08-14 DIAGNOSIS — D82.1 DIGEORGE SYNDROME: Primary | ICD-10-CM

## 2017-08-14 PROCEDURE — 99213 OFFICE O/P EST LOW 20 MIN: CPT | Mod: TH,S$PBB,, | Performed by: OBSTETRICS & GYNECOLOGY

## 2017-08-14 PROCEDURE — 99999 PR PBB SHADOW E&M-EST. PATIENT-LVL II: CPT | Mod: PBBFAC,,, | Performed by: OBSTETRICS & GYNECOLOGY

## 2017-08-14 PROCEDURE — 3008F BODY MASS INDEX DOCD: CPT | Mod: ,,, | Performed by: OBSTETRICS & GYNECOLOGY

## 2017-08-14 PROCEDURE — 99212 OFFICE O/P EST SF 10 MIN: CPT | Mod: PBBFAC | Performed by: OBSTETRICS & GYNECOLOGY

## 2017-08-14 NOTE — PROGRESS NOTES
Aware rpt c/section planned at [facility]  Has f/u with mfm on 8/31/17  Tl consent signed today  Reviewed tubal ligation procedure in detail-  Reviewed risks including but not limited to infection, bleeding, damage to bowel/bladder, cva;htn. Pt aware procedure is permanent and cannot be reversed.  Pt aware risk of failure 3-5/1000; slightly increased risk for pregnancy in tube; pt aware needs pregnancy test if skips menses.  may see a change in menses--heavier, irregular,  All questions answered to the best of my ability.  Alternatives reviewed --condoms, ocp, mirena, depo, nuva ring, nexplanon, abstinence.  Consents signed--patient also given a copy  Aware to continue iron daily;  +fetal movement, no srom, no vag bleeding  Reviewed ptl signs/symptoms  Last day of work --last week

## 2017-08-25 ENCOUNTER — TELEPHONE (OUTPATIENT)
Dept: MATERNAL FETAL MEDICINE | Facility: CLINIC | Age: 32
End: 2017-08-25

## 2017-08-25 NOTE — TELEPHONE ENCOUNTER
----- Message from Eliana Ma sent at 8/25/2017 12:28 PM CDT -----  Contact: self  Pt called to speak with Dr Jung's nurse/ Pt would like to reschedule her apt from Thrusday of next week August 31 to Sept 1st.(Pt is also scheduled for fetal echo on August 31st) Pt can be reached at [phone].kp      Advised patient to keep her scheduled appointments because fetal echo would not be able to move her appointment to Friday.  Patient verbalized her understanding.

## 2017-08-28 ENCOUNTER — TELEPHONE (OUTPATIENT)
Dept: MATERNAL FETAL MEDICINE | Facility: CLINIC | Age: 32
End: 2017-08-28

## 2017-08-28 NOTE — TELEPHONE ENCOUNTER
"Pt reports that she will not be coming for her appointments on Thursday due to "bad weather coming". Per pt's request she was giving the number to peds cardiology to reschedule and instructed pt to call me back so that I can reschedule her MFM appointment to coordinate with the new fetal echo time. Pt verbalized understanding of information.      ----- Message from Eliana Ma sent at 8/28/2017  1:18 PM CDT -----  Contact: self  Pt MRN [id number] Lauren Griggs called to speak with Dr Jung's nurse regarding her apts on Thursday/ Fetal Echo 8/31/17 f/u with MFM u/s. Pt wants it all rescheduled due to weather. Pt can be reached at [phone].    "

## 2017-09-05 ENCOUNTER — LAB VISIT (OUTPATIENT)
Dept: LAB | Facility: HOSPITAL | Age: 32
End: 2017-09-05
Attending: OBSTETRICS & GYNECOLOGY
Payer: MEDICAID

## 2017-09-05 ENCOUNTER — ROUTINE PRENATAL (OUTPATIENT)
Dept: OBSTETRICS AND GYNECOLOGY | Facility: CLINIC | Age: 32
End: 2017-09-05
Payer: MEDICAID

## 2017-09-05 VITALS
BODY MASS INDEX: 27.38 KG/M2 | WEIGHT: 149.69 LBS | DIASTOLIC BLOOD PRESSURE: 77 MMHG | SYSTOLIC BLOOD PRESSURE: 112 MMHG

## 2017-09-05 DIAGNOSIS — O34.219 PREVIOUS CESAREAN DELIVERY AFFECTING PREGNANCY, ANTEPARTUM: ICD-10-CM

## 2017-09-05 DIAGNOSIS — D82.1 DIGEORGE SYNDROME: ICD-10-CM

## 2017-09-05 DIAGNOSIS — Z34.83 ENCOUNTER FOR SUPERVISION OF OTHER NORMAL PREGNANCY IN THIRD TRIMESTER: ICD-10-CM

## 2017-09-05 DIAGNOSIS — Z34.83 ENCOUNTER FOR SUPERVISION OF OTHER NORMAL PREGNANCY IN THIRD TRIMESTER: Primary | ICD-10-CM

## 2017-09-05 LAB
ERYTHROCYTE [DISTWIDTH] IN BLOOD BY AUTOMATED COUNT: 14.6 %
HCT VFR BLD AUTO: 32.9 %
HGB BLD-MCNC: 10.8 G/DL
MCH RBC QN AUTO: 28.7 PG
MCHC RBC AUTO-ENTMCNC: 32.8 G/DL
MCV RBC AUTO: 88 FL
PLATELET # BLD AUTO: 220 K/UL
PMV BLD AUTO: 11.5 FL
RBC # BLD AUTO: 3.76 M/UL
WBC # BLD AUTO: 4.75 K/UL

## 2017-09-05 PROCEDURE — 99999 PR PBB SHADOW E&M-EST. PATIENT-LVL III: CPT | Mod: PBBFAC,,, | Performed by: OBSTETRICS & GYNECOLOGY

## 2017-09-05 PROCEDURE — 99213 OFFICE O/P EST LOW 20 MIN: CPT | Mod: TH,S$PBB,, | Performed by: OBSTETRICS & GYNECOLOGY

## 2017-09-05 PROCEDURE — 85027 COMPLETE CBC AUTOMATED: CPT

## 2017-09-05 PROCEDURE — 99213 OFFICE O/P EST LOW 20 MIN: CPT | Mod: PBBFAC,PN | Performed by: OBSTETRICS & GYNECOLOGY

## 2017-09-05 PROCEDURE — 36415 COLL VENOUS BLD VENIPUNCTURE: CPT | Mod: PO

## 2017-09-05 PROCEDURE — 3008F BODY MASS INDEX DOCD: CPT | Mod: ,,, | Performed by: OBSTETRICS & GYNECOLOGY

## 2017-09-05 NOTE — PROGRESS NOTES
Aware of c/section date and time in [place]  Still wants tubal (consents previously signed)  Taking iron daily  F/u cbc today  gbs next visit  Reviewed kick counts/ptl

## 2017-09-07 ENCOUNTER — TELEPHONE (OUTPATIENT)
Dept: OBSTETRICS AND GYNECOLOGY | Facility: CLINIC | Age: 32
End: 2017-09-07

## 2017-09-07 ENCOUNTER — OFFICE VISIT (OUTPATIENT)
Dept: PEDIATRIC CARDIOLOGY | Facility: CLINIC | Age: 32
End: 2017-09-07
Attending: PEDIATRICS
Payer: MEDICAID

## 2017-09-07 ENCOUNTER — OFFICE VISIT (OUTPATIENT)
Dept: MATERNAL FETAL MEDICINE | Facility: CLINIC | Age: 32
End: 2017-09-07
Attending: OBSTETRICS & GYNECOLOGY
Payer: MEDICAID

## 2017-09-07 DIAGNOSIS — D82.1 DIGEORGE SYNDROME: Primary | ICD-10-CM

## 2017-09-07 DIAGNOSIS — Q20.0 TRUNCUS ARTERIOSUS, EDWARDS' TYPE II: ICD-10-CM

## 2017-09-07 DIAGNOSIS — Q20.0 TRUNCUS ARTERIOSUS, EDWARDS' TYPE II: Primary | ICD-10-CM

## 2017-09-07 DIAGNOSIS — D82.1 DIGEORGE SYNDROME: ICD-10-CM

## 2017-09-07 DIAGNOSIS — Z36.89 ENCOUNTER FOR ULTRASOUND TO CHECK FETAL GROWTH: ICD-10-CM

## 2017-09-07 DIAGNOSIS — O35.10X0 CHROMOSOME FETAL ABNORMALITY IN PREGNANCY, ANTEPARTUM, NOT APPLICABLE OR UNSPECIFIED FETUS: ICD-10-CM

## 2017-09-07 DIAGNOSIS — O35.BXX0 FETAL VENTRICULAR SEPTAL DEFECT AFFECTING ANTEPARTUM CARE OF MOTHER, NOT APPLICABLE OR UNSPECIFIED FETUS: Primary | ICD-10-CM

## 2017-09-07 PROCEDURE — 93325 DOPPLER ECHO COLOR FLOW MAPG: CPT | Mod: PBBFAC,PO | Performed by: PEDIATRICS

## 2017-09-07 PROCEDURE — 76816 OB US FOLLOW-UP PER FETUS: CPT | Mod: PBBFAC | Performed by: OBSTETRICS & GYNECOLOGY

## 2017-09-07 PROCEDURE — 99211 OFF/OP EST MAY X REQ PHY/QHP: CPT | Mod: PBBFAC,PO,25 | Performed by: PEDIATRICS

## 2017-09-07 PROCEDURE — 3008F BODY MASS INDEX DOCD: CPT | Mod: ,,, | Performed by: OBSTETRICS & GYNECOLOGY

## 2017-09-07 PROCEDURE — 93325 DOPPLER ECHO COLOR FLOW MAPG: CPT | Mod: 26,S$PBB,, | Performed by: PEDIATRICS

## 2017-09-07 PROCEDURE — 76816 OB US FOLLOW-UP PER FETUS: CPT | Mod: 26,S$PBB,, | Performed by: OBSTETRICS & GYNECOLOGY

## 2017-09-07 PROCEDURE — 76825 ECHO EXAM OF FETAL HEART: CPT | Mod: 26,S$PBB,, | Performed by: PEDIATRICS

## 2017-09-07 PROCEDURE — 99212 OFFICE O/P EST SF 10 MIN: CPT | Mod: S$PBB,25,TH, | Performed by: OBSTETRICS & GYNECOLOGY

## 2017-09-07 PROCEDURE — 76827 ECHO EXAM OF FETAL HEART: CPT | Mod: 26,S$PBB,, | Performed by: PEDIATRICS

## 2017-09-07 PROCEDURE — 76819 FETAL BIOPHYS PROFIL W/O NST: CPT | Mod: 26,S$PBB,, | Performed by: OBSTETRICS & GYNECOLOGY

## 2017-09-07 PROCEDURE — 99213 OFFICE O/P EST LOW 20 MIN: CPT | Mod: 25,S$PBB,, | Performed by: PEDIATRICS

## 2017-09-07 PROCEDURE — 76827 ECHO EXAM OF FETAL HEART: CPT | Mod: PBBFAC,PO | Performed by: PEDIATRICS

## 2017-09-07 PROCEDURE — 3008F BODY MASS INDEX DOCD: CPT | Mod: ,,, | Performed by: PEDIATRICS

## 2017-09-07 PROCEDURE — 99999 PR PBB SHADOW E&M-EST. PATIENT-LVL I: CPT | Mod: PBBFAC,,, | Performed by: PEDIATRICS

## 2017-09-07 PROCEDURE — 76825 ECHO EXAM OF FETAL HEART: CPT | Mod: PBBFAC,PO | Performed by: PEDIATRICS

## 2017-09-07 PROCEDURE — 76819 FETAL BIOPHYS PROFIL W/O NST: CPT | Mod: PBBFAC | Performed by: OBSTETRICS & GYNECOLOGY

## 2017-09-07 NOTE — LETTER
September 7, 2017      Tiffany Garcia MD  [address] [facility] [address]           [facility] - Maternal Fetal Med  [address]  [facility] [address]  Phone: [phone]          Patient: Lauren Griggs   MR Number: [id number]   YOB: 1985   Date of Visit: 9/7/2017       Dear Dr. Tiffany Garcia:    Thank you for referring Lauren Griggs to me for evaluation. Attached you will find relevant portions of my assessment and plan of care.    If you have questions, please do not hesitate to call me. I look forward to following Lauren Griggs along with you.    Sincerely,    Caitie Daniels RN    Enclosure  CC:  No Recipients    If you would like to receive this communication electronically, please contact [email or web address] or [phone] to request more information on [employer] Link access.    For providers and/or their staff who would like to refer a patient to [facility], please contact us through our one-stop-shop provider referral line, [facility], at [phone].    If you feel you have received this communication in error or would no longer like to receive these types of communications, please e-mail [email or web address]

## 2017-09-07 NOTE — PROGRESS NOTES
CS scheduled for 10/5/17 at 11:00am. Reviewed CS prep information sheet with pt and given a copy. Pt verbalized understanding. Pt gave us a tubal consent. It has been scanned into epic.

## 2017-09-07 NOTE — PROGRESS NOTES
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    Pregnancy  =========    Mcadams pregnancy. Number of fetuses: 1.    Dating  ======    LMP on: 1/14/2017  Cycle: regular cycle  GA by LMP 33 w + 5 d  NABIL by LMP: 10/21/2017  Ultrasound examination on: 9/7/2017  GA by U/S based upon: AC, BPD, Femur, HC  GA by U/S 33 w + 5 d  NABIL by U/S: 10/21/2017  Assigned: Dating performed on 03/9/2017, based on ultrasound (CRL)  Assigned GA 35 w + 0 d  Assigned NABIL: 10/12/2017    General Evaluation  ==============    Cardiac activity: present.  bpm.  Fetal movements: visualized.  Presentation: cephalic.  Placenta: posterior.  Umbilical cord: 3 vessel cord, placental insertion: normal.  Amniotic fluid: MVP 5.8 cm.    Fetal Biometry  ============    Fetal Biometry  BPD 86.9 mm 35w 1d Hadlock  .8 mm 33w 6d Aletha  .2 mm 33w 4d Hadlock  .9 mm 33w 5d Hadlock  Femur 62.8 mm 32w 4d Hadlock  EFW 2,199 g 11% Matthew  Calculated by: Hadlock (BPD-HC-AC-FL)  EFW (lb) 4 lb  EFW (oz) 14 oz  Cephalic index 0.84  HC / AC 1.02  FL / BPD 0.72  FL / AC 0.21  MVP 5.8 cm   bpm    Fetal Anatomy  ============    Cranium: normal  Posterior fossa: normal  4-chamber view: abnormal  4-chamber view: truncus arteriosus suspected  Stomach: normal  Kidneys: normal  Bladder: normal  Arms: both visualized  Legs: both visualized  Gender: male  Wants to know gender: yes  Other: A full anatomy survey previously performed.    Consultation  ==========    Lauren returns today for follow-up. She is currently at 35 weeks gestation. She saw Dr. Phillips earlier in the day for a repeat fetal echo. He saw  no evidence of hydrops. She reports good fetal movement. Since her last visit we were able to get the results of her chromosomal evaluation in  2012 and it confirmed that she  is a carrier of the DiGeorge deletion. She denies any significant problems since her last visit. She has not had  any signs or symptoms of  labor.    On ultrasound today fetal growth is at the 11th percentile which is somewhat down since the time of her prior scan which was at the 31st  percentile. This is predominantly driven by a femur length which is 2-3 weeks behind her dates. The amniotic fluid volume is normal. A  biophysical profile was 8 out of 8. There was no evidence of hydrops. The fetus was active. Since she just had an echo, we did not perform a  repeat cardiac echo but we continue to see a four chamber heart. We clearly see only a single great vessel in the central portion of the heart  which suggests the appearance of truncus arteriosus.    We have scheduled her for  delivery on 10/5 at 1100. In the meantime I recommend that she have weekly BPP. If any problems arise  that might warrant earlier delivery, we can get her here before that.    I overall spent approximately 10 minutes in face to face time with the patient and her family, greater than 50% of which was in counseling and  care coordination.    Impression  =========    Normal fetal growth but a slight lag in growth velocity is noted with growth now at the 11th percentile.  Normal BPP and amniotic fluid volume  Fetal truncus arteriosus, no evidence of hydrops.    Recommendation  ==============    Thank you again for allowing us to participate in the care of your patients. If you have any questions concerning today's consultation, feel free  to contact me or one of my partners. We can be reached at [phone] during normal business hours. If you have a question after normal  business hours, please contact Labor and Delivery at [phone].

## 2017-09-08 NOTE — TELEPHONE ENCOUNTER
Unable to leave a message for the pt. Because her voicemail is not set-up. [email or web address]    Orders placed for weekly bpp; plz put pt on schedule for next week thurs or friday

## 2017-09-08 NOTE — PROGRESS NOTES
Ms. Griggs  is a 31 y.o. year old  , referred by Dr. Jung because of suspected fetal conotruncal defect.  This patient was first seen in our clinic on 2017.  Evaluation at that time resulted in the  impression that Ms. 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  There were similar findings on the follow up visit on .  She returned today for scheduled follow up.    The patient presented at approximately 35 weeks gestation (Patient's last menstrual period was 2017.).  The patient denied any complaints.    Past medical history: Unremarkable.  Past surgical history: S/P C/S x 1.  Past gestational history: The patient has an 11-year-old daughter and a 5-year-old son (each from a different partner).     Family history: The 5-year-old son (Alfredito Griggs, MR# [id number]) is diagnosed with DiGeorge syndrome and is S/P reimplantation of anomalous right pulmonary artery.    Medications:   Outpatient Encounter Prescriptions as of 2017   Medication Sig Dispense Refill    ferrous sulfate 325 mg (65 mg iron) Tab tablet Take 1 tablet (325 mg total) by mouth once daily. 30 tablet 3    PNV with calcium no.72-iron-FA 27 mg iron- 1 mg Tab Take 1 tablet by mouth once daily.       No facility-administered encounter medications on file as of 2017.        Allergies: Review of patient's allergies indicates no known allergies.    Last menstrual period 2017.    Fetal echocardiogram revealed a four chamber fetal heart with situs solitus.  The ventricles appeared to be equal in size.  The contractility of both ventricles was good.  The fetal heart rate was within the normal range, and regular.  There was a large anteriorly malaligned VSD seen.  There appeared to be truncus arteriosus with dysplastic, thickened (bicuspid?) truncal valve and separate origin of the pulmonary arteries. The aortic arch was well visualized, and appeared to be widely  patent.  There was no pleural or pericardial effusion seen.    Doppler analysis revealed a three vessel umbilical cord, with normal flow patterns, and velocities by Doppler.  There was a normal flow pattern seen in the ductus venosus.  There was evidence of normal systemic, and pulmonary venous return seen.  There was a normal right to left shunt seen across the foramen ovale.  There were normal inflow patterns seen across the AV-valves, without significant insufficiency.  There was a bidirectional ventricular level shunt seen.  The truncal valve appeared to be unobstructed.  There was mild truncal valve insufficiency seen.  The aortic arch appeared to be unobstructed.    Impression:  It is again our impression that Ms. 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  As discussed previouslyt, it is likely that this child will also have DiGeorge syndrome.  The patient is scheduled for delivery by  on .  Of course, the child will need to be evaluated by Pediatric Cardiology shortly after birth. Prostaglandin-infusion is not indicated.  No further follow up is scheduled in our clinic, but, of course, we will always be available to reevaluate this patient, if needed.    Time spent: 30 minutes, 50% dedicated to counseling.

## 2017-09-14 ENCOUNTER — TELEPHONE (OUTPATIENT)
Dept: OBSTETRICS AND GYNECOLOGY | Facility: CLINIC | Age: 32
End: 2017-09-14

## 2017-09-14 NOTE — TELEPHONE ENCOUNTER
Scheduled pt. for weekly bpp. christelle Fuchs    On issues like these (mfm is recommending a weekly bpp; if you are unable to reach by phone, send a letter

## 2017-09-15 ENCOUNTER — PROCEDURE VISIT (OUTPATIENT)
Dept: OBSTETRICS AND GYNECOLOGY | Facility: CLINIC | Age: 32
End: 2017-09-15
Payer: MEDICAID

## 2017-09-15 DIAGNOSIS — D82.1 DIGEORGE SYNDROME: ICD-10-CM

## 2017-09-15 DIAGNOSIS — Z3A.36 36 WEEKS GESTATION OF PREGNANCY: ICD-10-CM

## 2017-09-15 PROCEDURE — 76819 FETAL BIOPHYS PROFIL W/O NST: CPT | Mod: 26,S$PBB,, | Performed by: OBSTETRICS & GYNECOLOGY

## 2017-09-15 PROCEDURE — 76819 FETAL BIOPHYS PROFIL W/O NST: CPT | Mod: PBBFAC,PN | Performed by: OBSTETRICS & GYNECOLOGY

## 2017-09-21 ENCOUNTER — ROUTINE PRENATAL (OUTPATIENT)
Dept: OBSTETRICS AND GYNECOLOGY | Facility: CLINIC | Age: 32
End: 2017-09-21
Payer: MEDICAID

## 2017-09-21 ENCOUNTER — HOSPITAL ENCOUNTER (OUTPATIENT)
Facility: HOSPITAL | Age: 32
Discharge: OTHER INSTITUTION | End: 2017-09-21
Attending: OBSTETRICS & GYNECOLOGY | Admitting: OBSTETRICS & GYNECOLOGY
Payer: MEDICAID

## 2017-09-21 ENCOUNTER — PATIENT MESSAGE (OUTPATIENT)
Dept: OBSTETRICS AND GYNECOLOGY | Facility: CLINIC | Age: 32
End: 2017-09-21

## 2017-09-21 ENCOUNTER — NURSE TRIAGE (OUTPATIENT)
Dept: ADMINISTRATIVE | Facility: CLINIC | Age: 32
End: 2017-09-21

## 2017-09-21 ENCOUNTER — ANESTHESIA (OUTPATIENT)
Dept: OBSTETRICS AND GYNECOLOGY | Facility: OTHER | Age: 32
End: 2017-09-21
Payer: MEDICAID

## 2017-09-21 ENCOUNTER — SURGERY (OUTPATIENT)
Age: 32
End: 2017-09-21

## 2017-09-21 ENCOUNTER — ANESTHESIA EVENT (OUTPATIENT)
Dept: OBSTETRICS AND GYNECOLOGY | Facility: OTHER | Age: 32
End: 2017-09-21
Payer: MEDICAID

## 2017-09-21 ENCOUNTER — HOSPITAL ENCOUNTER (INPATIENT)
Facility: OTHER | Age: 32
LOS: 3 days | Discharge: HOME | End: 2017-09-24
Attending: OBSTETRICS & GYNECOLOGY | Admitting: OBSTETRICS & GYNECOLOGY
Payer: MEDICAID

## 2017-09-21 VITALS
SYSTOLIC BLOOD PRESSURE: 121 MMHG | WEIGHT: 157.88 LBS | DIASTOLIC BLOOD PRESSURE: 62 MMHG | BODY MASS INDEX: 28.87 KG/M2

## 2017-09-21 VITALS
BODY MASS INDEX: 29.05 KG/M2 | RESPIRATION RATE: 18 BRPM | DIASTOLIC BLOOD PRESSURE: 75 MMHG | HEART RATE: 91 BPM | TEMPERATURE: 98 F | HEIGHT: 62 IN | WEIGHT: 157.88 LBS | SYSTOLIC BLOOD PRESSURE: 111 MMHG

## 2017-09-21 DIAGNOSIS — O34.10 UTERINE FIBROID IN PREGNANCY: ICD-10-CM

## 2017-09-21 DIAGNOSIS — N93.9 VAGINAL BLEEDING: ICD-10-CM

## 2017-09-21 DIAGNOSIS — Q20.0 TRUNCUS ARTERIOSUS, EDWARDS' TYPE II: Primary | ICD-10-CM

## 2017-09-21 DIAGNOSIS — D25.9 UTERINE FIBROID IN PREGNANCY: ICD-10-CM

## 2017-09-21 DIAGNOSIS — Z98.891 STATUS POST REPEAT LOW TRANSVERSE CESAREAN SECTION: ICD-10-CM

## 2017-09-21 DIAGNOSIS — Z98.891 PREVIOUS CESAREAN SECTION: Primary | ICD-10-CM

## 2017-09-21 DIAGNOSIS — O46.93 VAGINAL BLEEDING IN PREGNANCY, THIRD TRIMESTER: ICD-10-CM

## 2017-09-21 DIAGNOSIS — D82.1 DIGEORGE SYNDROME: ICD-10-CM

## 2017-09-21 DIAGNOSIS — Z98.891 PREVIOUS CESAREAN SECTION: ICD-10-CM

## 2017-09-21 DIAGNOSIS — O46.90 VAGINAL BLEEDING IN PREGNANCY: ICD-10-CM

## 2017-09-21 PROBLEM — O09.899 RUBELLA NON-IMMUNE STATUS, ANTEPARTUM: Status: ACTIVE | Noted: 2017-09-21

## 2017-09-21 PROBLEM — Z28.39 RUBELLA NON-IMMUNE STATUS, ANTEPARTUM: Status: ACTIVE | Noted: 2017-09-21

## 2017-09-21 LAB
ABO + RH BLD: NORMAL
ABO + RH BLD: NORMAL
ALLENS TEST: ABNORMAL
APTT BLDCRRT: 33.6 SEC
BASOPHILS # BLD AUTO: 0.01 K/UL
BASOPHILS NFR BLD: 0.2 %
BLD GP AB SCN CELLS X3 SERPL QL: NORMAL
BLD GP AB SCN CELLS X3 SERPL QL: NORMAL
DIFFERENTIAL METHOD: ABNORMAL
EOSINOPHIL # BLD AUTO: 0.1 K/UL
EOSINOPHIL NFR BLD: 1.4 %
ERYTHROCYTE [DISTWIDTH] IN BLOOD BY AUTOMATED COUNT: 14.8 %
FIBRINOGEN PPP-MCNC: 405 MG/DL
HCO3 UR-SCNC: 23.1 MMOL/L (ref 24–28)
HCT VFR BLD AUTO: 33.8 %
HGB BLD-MCNC: 11 G/DL
HIV1+2 IGG SERPL QL IA.RAPID: NEGATIVE
INR PPP: 1
LYMPHOCYTES # BLD AUTO: 1.1 K/UL
LYMPHOCYTES NFR BLD: 20.1 %
MCH RBC QN AUTO: 28.8 PG
MCHC RBC AUTO-ENTMCNC: 32.5 G/DL
MCV RBC AUTO: 89 FL
MONOCYTES # BLD AUTO: 0.4 K/UL
MONOCYTES NFR BLD: 6.2 %
NEUTROPHILS # BLD AUTO: 4.1 K/UL
NEUTROPHILS NFR BLD: 72.1 %
PCO2 BLDA: 47.2 MMHG (ref 35–45)
PH SMN: 7.3 [PH] (ref 7.35–7.45)
PLATELET # BLD AUTO: 176 K/UL
PMV BLD AUTO: 10.6 FL
PO2 BLDA: 11 MMHG (ref 80–100)
POC BE: -3 MMOL/L
POC SATURATED O2: 9 % (ref 95–100)
PROTHROMBIN TIME: 10.6 SEC
RBC # BLD AUTO: 3.82 M/UL
SAMPLE: ABNORMAL
SITE: ABNORMAL
WBC # BLD AUTO: 5.63 K/UL

## 2017-09-21 PROCEDURE — 27200688 HC TRAY, SPINAL-HYPER/ ISOBARIC: Performed by: ANESTHESIOLOGY

## 2017-09-21 PROCEDURE — 99213 OFFICE O/P EST LOW 20 MIN: CPT | Mod: TH,S$PBB,25, | Performed by: ADVANCED PRACTICE MIDWIFE

## 2017-09-21 PROCEDURE — 86900 BLOOD TYPING SEROLOGIC ABO: CPT | Mod: 91

## 2017-09-21 PROCEDURE — 88302 TISSUE EXAM BY PATHOLOGIST: CPT | Mod: 26,,, | Performed by: PATHOLOGY

## 2017-09-21 PROCEDURE — 86703 HIV-1/HIV-2 1 RESULT ANTBDY: CPT

## 2017-09-21 PROCEDURE — 88302 TISSUE EXAM BY PATHOLOGIST: CPT | Performed by: PATHOLOGY

## 2017-09-21 PROCEDURE — 86900 BLOOD TYPING SEROLOGIC ABO: CPT

## 2017-09-21 PROCEDURE — 25000003 PHARM REV CODE 250: Performed by: ADVANCED PRACTICE MIDWIFE

## 2017-09-21 PROCEDURE — 63600175 PHARM REV CODE 636 W HCPCS: Performed by: STUDENT IN AN ORGANIZED HEALTH CARE EDUCATION/TRAINING PROGRAM

## 2017-09-21 PROCEDURE — 58611 LIGATE OVIDUCT(S) ADD-ON: CPT | Mod: ,,, | Performed by: OBSTETRICS & GYNECOLOGY

## 2017-09-21 PROCEDURE — 25000003 PHARM REV CODE 250: Performed by: OBSTETRICS & GYNECOLOGY

## 2017-09-21 PROCEDURE — 82803 BLOOD GASES ANY COMBINATION: CPT

## 2017-09-21 PROCEDURE — 25000003 PHARM REV CODE 250: Performed by: STUDENT IN AN ORGANIZED HEALTH CARE EDUCATION/TRAINING PROGRAM

## 2017-09-21 PROCEDURE — S0028 INJECTION, FAMOTIDINE, 20 MG: HCPCS | Performed by: STUDENT IN AN ORGANIZED HEALTH CARE EDUCATION/TRAINING PROGRAM

## 2017-09-21 PROCEDURE — 37000009 HC ANESTHESIA EA ADD 15 MINS: Performed by: OBSTETRICS & GYNECOLOGY

## 2017-09-21 PROCEDURE — 3008F BODY MASS INDEX DOCD: CPT | Mod: ,,, | Performed by: ADVANCED PRACTICE MIDWIFE

## 2017-09-21 PROCEDURE — 85025 COMPLETE CBC W/AUTO DIFF WBC: CPT

## 2017-09-21 PROCEDURE — 59514 CESAREAN DELIVERY ONLY: CPT | Mod: ,,, | Performed by: ANESTHESIOLOGY

## 2017-09-21 PROCEDURE — 25000003 PHARM REV CODE 250: Performed by: ANESTHESIOLOGY

## 2017-09-21 PROCEDURE — 85730 THROMBOPLASTIN TIME PARTIAL: CPT

## 2017-09-21 PROCEDURE — 11000001 HC ACUTE MED/SURG PRIVATE ROOM

## 2017-09-21 PROCEDURE — 85610 PROTHROMBIN TIME: CPT

## 2017-09-21 PROCEDURE — 99212 OFFICE O/P EST SF 10 MIN: CPT | Mod: PBBFAC | Performed by: ADVANCED PRACTICE MIDWIFE

## 2017-09-21 PROCEDURE — 99999 PR PBB SHADOW E&M-EST. PATIENT-LVL II: CPT | Mod: PBBFAC,,, | Performed by: ADVANCED PRACTICE MIDWIFE

## 2017-09-21 PROCEDURE — 86850 RBC ANTIBODY SCREEN: CPT | Mod: 91

## 2017-09-21 PROCEDURE — 59025 FETAL NON-STRESS TEST: CPT

## 2017-09-21 PROCEDURE — 86901 BLOOD TYPING SEROLOGIC RH(D): CPT

## 2017-09-21 PROCEDURE — 59514 CESAREAN DELIVERY ONLY: CPT | Mod: AT,,, | Performed by: OBSTETRICS & GYNECOLOGY

## 2017-09-21 PROCEDURE — S0020 INJECTION, BUPIVICAINE HYDRO: HCPCS | Performed by: ANESTHESIOLOGY

## 2017-09-21 PROCEDURE — 85384 FIBRINOGEN ACTIVITY: CPT

## 2017-09-21 PROCEDURE — 0UB70ZZ EXCISION OF BILATERAL FALLOPIAN TUBES, OPEN APPROACH: ICD-10-PCS | Performed by: OBSTETRICS & GYNECOLOGY

## 2017-09-21 PROCEDURE — 99900035 HC TECH TIME PER 15 MIN (STAT)

## 2017-09-21 PROCEDURE — 63600175 PHARM REV CODE 636 W HCPCS: Performed by: ANESTHESIOLOGY

## 2017-09-21 PROCEDURE — 59025 FETAL NON-STRESS TEST: CPT | Mod: 26,,, | Performed by: ADVANCED PRACTICE MIDWIFE

## 2017-09-21 PROCEDURE — 86592 SYPHILIS TEST NON-TREP QUAL: CPT

## 2017-09-21 PROCEDURE — 99211 OFF/OP EST MAY X REQ PHY/QHP: CPT | Mod: 25,27

## 2017-09-21 PROCEDURE — 37000008 HC ANESTHESIA 1ST 15 MINUTES: Performed by: OBSTETRICS & GYNECOLOGY

## 2017-09-21 RX ORDER — OXYTOCIN 10 [USP'U]/ML
INJECTION, SOLUTION INTRAMUSCULAR; INTRAVENOUS
Status: DISCONTINUED | OUTPATIENT
Start: 2017-09-21 | End: 2017-09-21

## 2017-09-21 RX ORDER — NALOXONE HCL 0.4 MG/ML
0.04 VIAL (ML) INJECTION EVERY 5 MIN PRN
Status: DISCONTINUED | OUTPATIENT
Start: 2017-09-21 | End: 2017-09-24 | Stop reason: HOSPADM

## 2017-09-21 RX ORDER — KETOROLAC TROMETHAMINE 30 MG/ML
30 INJECTION, SOLUTION INTRAMUSCULAR; INTRAVENOUS EVERY 6 HOURS
Status: DISPENSED | OUTPATIENT
Start: 2017-09-22 | End: 2017-09-22

## 2017-09-21 RX ORDER — BUPIVACAINE HYDROCHLORIDE 7.5 MG/ML
INJECTION, SOLUTION EPIDURAL; RETROBULBAR
Status: DISCONTINUED | OUTPATIENT
Start: 2017-09-21 | End: 2017-09-21

## 2017-09-21 RX ORDER — CARBOPROST TROMETHAMINE 250 UG/ML
250 INJECTION, SOLUTION INTRAMUSCULAR
Status: DISCONTINUED | OUTPATIENT
Start: 2017-09-21 | End: 2017-09-21

## 2017-09-21 RX ORDER — ONDANSETRON 8 MG/1
8 TABLET, ORALLY DISINTEGRATING ORAL EVERY 8 HOURS PRN
Status: DISCONTINUED | OUTPATIENT
Start: 2017-09-21 | End: 2017-09-21 | Stop reason: HOSPADM

## 2017-09-21 RX ORDER — SODIUM CITRATE AND CITRIC ACID MONOHYDRATE 334; 500 MG/5ML; MG/5ML
30 SOLUTION ORAL
Status: DISCONTINUED | OUTPATIENT
Start: 2017-09-21 | End: 2017-09-21

## 2017-09-21 RX ORDER — HYDROCODONE BITARTRATE AND ACETAMINOPHEN 10; 325 MG/1; MG/1
1 TABLET ORAL EVERY 4 HOURS PRN
Status: DISCONTINUED | OUTPATIENT
Start: 2017-09-22 | End: 2017-09-24 | Stop reason: HOSPADM

## 2017-09-21 RX ORDER — SODIUM CHLORIDE, SODIUM LACTATE, POTASSIUM CHLORIDE, CALCIUM CHLORIDE 600; 310; 30; 20 MG/100ML; MG/100ML; MG/100ML; MG/100ML
INJECTION, SOLUTION INTRAVENOUS CONTINUOUS
Status: DISCONTINUED | OUTPATIENT
Start: 2017-09-21 | End: 2017-09-21

## 2017-09-21 RX ORDER — ONDANSETRON HYDROCHLORIDE 2 MG/ML
INJECTION, SOLUTION INTRAMUSCULAR; INTRAVENOUS
Status: DISCONTINUED | OUTPATIENT
Start: 2017-09-21 | End: 2017-09-21

## 2017-09-21 RX ORDER — ONDANSETRON 8 MG/1
8 TABLET, ORALLY DISINTEGRATING ORAL EVERY 8 HOURS PRN
Status: DISCONTINUED | OUTPATIENT
Start: 2017-09-21 | End: 2017-09-24 | Stop reason: HOSPADM

## 2017-09-21 RX ORDER — SODIUM CHLORIDE, SODIUM LACTATE, POTASSIUM CHLORIDE, CALCIUM CHLORIDE 600; 310; 30; 20 MG/100ML; MG/100ML; MG/100ML; MG/100ML
INJECTION, SOLUTION INTRAVENOUS CONTINUOUS PRN
Status: DISCONTINUED | OUTPATIENT
Start: 2017-09-21 | End: 2017-09-21

## 2017-09-21 RX ORDER — ACETAMINOPHEN 500 MG
500 TABLET ORAL EVERY 6 HOURS PRN
Status: DISCONTINUED | OUTPATIENT
Start: 2017-09-21 | End: 2017-09-21 | Stop reason: HOSPADM

## 2017-09-21 RX ORDER — FAMOTIDINE 10 MG/ML
20 INJECTION INTRAVENOUS
Status: DISCONTINUED | OUTPATIENT
Start: 2017-09-21 | End: 2017-09-21

## 2017-09-21 RX ORDER — DOCUSATE SODIUM 100 MG/1
200 CAPSULE, LIQUID FILLED ORAL 2 TIMES DAILY PRN
Status: DISCONTINUED | OUTPATIENT
Start: 2017-09-21 | End: 2017-09-24 | Stop reason: HOSPADM

## 2017-09-21 RX ORDER — HYDROCODONE BITARTRATE AND ACETAMINOPHEN 5; 325 MG/1; MG/1
1 TABLET ORAL EVERY 4 HOURS PRN
Status: DISCONTINUED | OUTPATIENT
Start: 2017-09-22 | End: 2017-09-24 | Stop reason: HOSPADM

## 2017-09-21 RX ORDER — HYDROCODONE BITARTRATE AND ACETAMINOPHEN 5; 325 MG/1; MG/1
1 TABLET ORAL EVERY 4 HOURS PRN
Status: DISCONTINUED | OUTPATIENT
Start: 2017-09-22 | End: 2017-09-21 | Stop reason: SDUPTHER

## 2017-09-21 RX ORDER — SODIUM CHLORIDE, SODIUM LACTATE, POTASSIUM CHLORIDE, CALCIUM CHLORIDE 600; 310; 30; 20 MG/100ML; MG/100ML; MG/100ML; MG/100ML
INJECTION, SOLUTION INTRAVENOUS CONTINUOUS
Status: DISCONTINUED | OUTPATIENT
Start: 2017-09-21 | End: 2017-09-21 | Stop reason: HOSPADM

## 2017-09-21 RX ORDER — OXYTOCIN/RINGER'S LACTATE 20/1000 ML
41.65 PLASTIC BAG, INJECTION (ML) INTRAVENOUS CONTINUOUS
Status: ACTIVE | OUTPATIENT
Start: 2017-09-21 | End: 2017-09-22

## 2017-09-21 RX ORDER — DIPHENHYDRAMINE HCL 25 MG
25 CAPSULE ORAL EVERY 4 HOURS PRN
Status: DISCONTINUED | OUTPATIENT
Start: 2017-09-21 | End: 2017-09-24 | Stop reason: HOSPADM

## 2017-09-21 RX ORDER — SIMETHICONE 80 MG
1 TABLET,CHEWABLE ORAL EVERY 6 HOURS PRN
Status: DISCONTINUED | OUTPATIENT
Start: 2017-09-21 | End: 2017-09-24 | Stop reason: HOSPADM

## 2017-09-21 RX ORDER — ACETAMINOPHEN 325 MG/1
650 TABLET ORAL EVERY 6 HOURS
Status: COMPLETED | OUTPATIENT
Start: 2017-09-22 | End: 2017-09-22

## 2017-09-21 RX ORDER — METHYLERGONOVINE MALEATE 0.2 MG/ML
200 INJECTION INTRAVENOUS
Status: DISCONTINUED | OUTPATIENT
Start: 2017-09-21 | End: 2017-09-21

## 2017-09-21 RX ORDER — CEFAZOLIN SODIUM 2 G/50ML
2 SOLUTION INTRAVENOUS
Status: DISCONTINUED | OUTPATIENT
Start: 2017-09-21 | End: 2017-09-21

## 2017-09-21 RX ORDER — IBUPROFEN 600 MG/1
600 TABLET ORAL EVERY 6 HOURS
Status: DISCONTINUED | OUTPATIENT
Start: 2017-09-22 | End: 2017-09-24 | Stop reason: HOSPADM

## 2017-09-21 RX ORDER — ONDANSETRON 2 MG/ML
4 INJECTION INTRAMUSCULAR; INTRAVENOUS EVERY 8 HOURS PRN
Status: DISCONTINUED | OUTPATIENT
Start: 2017-09-21 | End: 2017-09-24 | Stop reason: HOSPADM

## 2017-09-21 RX ORDER — HYDROMORPHONE HYDROCHLORIDE 2 MG/ML
INJECTION, SOLUTION INTRAMUSCULAR; INTRAVENOUS; SUBCUTANEOUS
Status: DISCONTINUED | OUTPATIENT
Start: 2017-09-21 | End: 2017-09-21

## 2017-09-21 RX ORDER — PHENYLEPHRINE HYDROCHLORIDE 10 MG/ML
INJECTION INTRAVENOUS
Status: DISCONTINUED | OUTPATIENT
Start: 2017-09-21 | End: 2017-09-21

## 2017-09-21 RX ORDER — OXYCODONE HYDROCHLORIDE 5 MG/1
5 TABLET ORAL EVERY 4 HOURS PRN
Status: DISPENSED | OUTPATIENT
Start: 2017-09-21 | End: 2017-09-22

## 2017-09-21 RX ORDER — MISOPROSTOL 200 UG/1
800 TABLET ORAL
Status: DISCONTINUED | OUTPATIENT
Start: 2017-09-21 | End: 2017-09-21

## 2017-09-21 RX ORDER — FENTANYL CITRATE 50 UG/ML
INJECTION, SOLUTION INTRAMUSCULAR; INTRAVENOUS
Status: DISCONTINUED | OUTPATIENT
Start: 2017-09-21 | End: 2017-09-21

## 2017-09-21 RX ORDER — OXYCODONE HYDROCHLORIDE 5 MG/1
10 TABLET ORAL EVERY 4 HOURS PRN
Status: DISPENSED | OUTPATIENT
Start: 2017-09-21 | End: 2017-09-22

## 2017-09-21 RX ADMIN — PHENYLEPHRINE HYDROCHLORIDE 100 MCG: 10 INJECTION INTRAVENOUS at 06:09

## 2017-09-21 RX ADMIN — OXYTOCIN 3 UNITS: 10 INJECTION, SOLUTION INTRAMUSCULAR; INTRAVENOUS at 06:09

## 2017-09-21 RX ADMIN — FAMOTIDINE 20 MG: 10 INJECTION INTRAVENOUS at 05:09

## 2017-09-21 RX ADMIN — OXYCODONE HYDROCHLORIDE 10 MG: 5 TABLET ORAL at 09:09

## 2017-09-21 RX ADMIN — SODIUM CITRATE AND CITRIC ACID MONOHYDRATE 30 ML: 500; 334 SOLUTION ORAL at 05:09

## 2017-09-21 RX ADMIN — PHENYLEPHRINE HYDROCHLORIDE 200 MCG: 10 INJECTION INTRAVENOUS at 06:09

## 2017-09-21 RX ADMIN — SODIUM CHLORIDE, SODIUM LACTATE, POTASSIUM CHLORIDE, AND CALCIUM CHLORIDE: 600; 310; 30; 20 INJECTION, SOLUTION INTRAVENOUS at 05:09

## 2017-09-21 RX ADMIN — SODIUM CHLORIDE, SODIUM LACTATE, POTASSIUM CHLORIDE, AND CALCIUM CHLORIDE 500 ML: 600; 310; 30; 20 INJECTION, SOLUTION INTRAVENOUS at 11:09

## 2017-09-21 RX ADMIN — ONDANSETRON 4 MG: 2 INJECTION, SOLUTION INTRAMUSCULAR; INTRAVENOUS at 06:09

## 2017-09-21 RX ADMIN — DIPHENHYDRAMINE HYDROCHLORIDE 25 MG: 25 CAPSULE ORAL at 09:09

## 2017-09-21 RX ADMIN — DEXTROSE 2 G: 50 INJECTION, SOLUTION INTRAVENOUS at 05:09

## 2017-09-21 RX ADMIN — PHENYLEPHRINE HYDROCHLORIDE 200 MCG: 10 INJECTION INTRAVENOUS at 05:09

## 2017-09-21 RX ADMIN — PHENYLEPHRINE HYDROCHLORIDE 100 MCG: 10 INJECTION INTRAVENOUS at 05:09

## 2017-09-21 RX ADMIN — FENTANYL CITRATE 10 MCG: 50 INJECTION, SOLUTION INTRAMUSCULAR; INTRAVENOUS at 05:09

## 2017-09-21 RX ADMIN — PROMETHAZINE HYDROCHLORIDE 12.5 MG: 25 INJECTION INTRAMUSCULAR; INTRAVENOUS at 08:09

## 2017-09-21 RX ADMIN — Medication 41.65 MILLI-UNITS/MIN: at 07:09

## 2017-09-21 RX ADMIN — BUPIVACAINE HYDROCHLORIDE 1.6 ML: 7.5 INJECTION, SOLUTION EPIDURAL; RETROBULBAR at 05:09

## 2017-09-21 RX ADMIN — HYDROMORPHONE HYDROCHLORIDE 0.1 MG: 2 INJECTION INTRAMUSCULAR; INTRAVENOUS; SUBCUTANEOUS at 05:09

## 2017-09-21 NOTE — ASSESSMENT & PLAN NOTE
Consulted with Dr. Garcia, consulting [facility], will continue EFM, IV fluids, labs and US ordered. al

## 2017-09-21 NOTE — PROGRESS NOTES
"Pt reports spotting this morning that has worsened, has had some spotting before and was told it was her fibroid causing her problems; Denies contractions but does state that the baby has been "balling up a lot"   Upon palpation, contractions felt, speculum with bright red blood pooling, cervix clsd upon visual inspection   Pt to deliver via c/s 10/5/17 in [place] with Dr. Stein due to fetal heart abnormalities  Pt sent next door to hospital to be monitored, KRYSTIN Hobson CNM aware   "

## 2017-09-21 NOTE — TELEPHONE ENCOUNTER
"Think that a fibroid busted and is having some spotting.    Reason for Disposition   MILD-MODERATE vaginal bleeding (i.e., small to medium clots; like mild menstrual period)    Answer Assessment - Initial Assessment Questions  1. ONSET: "When did this bleeding start?"         2 hours ago  2. DESCRIPTION: "Describe the bleeding that you are having." "How much bleeding is there?"     - SPOTTING: spotting, or pinkish / brownish mucous discharge; does not fill panti-liner or pad     - MILD:  less than 1 pad / hour; less than patient's usual menstrual bleeding    - MODERATE: 1-2 pads / hour; small-medium blood clots (e.g., pea, grape, small coin)     - SEVERE: soaking 2 or more pads/hour for 2 or more hours; bleeding not contained by pads or continuous red blood from vagina; large blood clots (e.g., golf ball, large coin)       Not a lot of bleeding, was is toilet  3. ABDOMINAL PAIN SEVERITY: If present, ask: "How bad is it?"  (e.g., Scale 1-10; mild, moderate, or severe)    - MILD (1-3): doesn't interfere with normal activities, abdomen soft and not tender to touch     - MODERATE (4-7): interferes with normal activities or awakens from sleep, tender to touch     - SEVERE (8-10): excruciating pain, doubled over, unable to do any normal activities      Knotting up every 10 min rates 5/10  4. PREGNANCY: "Do you know how many weeks or months pregnant you are?"       37  5. NABIL: "What date are you expecting to deliver?"      10/12 NABIL with  10/5  6. FETAL MOVEMENT: "Has the baby's movement decreased or changed significantly from normal?"      Same movement  7. HEMODYNAMIC STATUS: "Are you weak or feeling lightheaded?" If so, ask: "Can you stand and walk normally?" sa      Walking normally  8. OTHER SYMPTOMS: "What other symptoms are you having with the bleeding?" (e.g., leaking fluid from vagina, contractions)      Some vaginal bleeding    Protocols used: ST PREGNANCY - VAGINAL BLEEDING GREATER THAN 20 WEEKS " REBECCA

## 2017-09-21 NOTE — HOSPITAL COURSE
HD # 1: Admitted as transfer for vaginal bleeding in setting of fetal anomaly. Plan for rLTCS with BTL  HD # 2: POD # 1 s/p RLTCS/BTL - Patient doing well. Infant in NICU. No complaints.  Making PO advances  HD # 3: POD # 2 s/p RLTCS/BTL - Patient doing well. Infant in NICU. No complaints.  Making PO advances  HD # 4: POD # 3 s/p RLTCS/BTL - Patient doing well. Infant in NICU. No complaints.  Making routine advances

## 2017-09-21 NOTE — ANESTHESIA PROCEDURE NOTES
Spinal    Diagnosis: IUP  Patient location during procedure: OR  Start time: 9/21/2017 5:47 PM  Timeout: 9/21/2017 5:47 PM  End time: 9/21/2017 5:48 PM  Staffing  Anesthesiologist: GIRISH CORONADO  Resident/CRNA: WILLIE HARKINS  Performed: resident/CRNA   Preanesthetic Checklist  Completed: patient identified, site marked, surgical consent, pre-op evaluation, timeout performed, IV checked, risks and benefits discussed and monitors and equipment checked  Spinal Block  Patient position: sitting  Prep: ChloraPrep  Patient monitoring: heart rate and continuous pulse ox  Approach: midline  Location: L3-4  Injection technique: single shot  CSF Fluid: clear free-flowing CSF  Needle  Needle type: pencil-tip   Needle gauge: 25 G  Needle length: 3.5 in  Additional Documentation: incremental injection  Needle localization: anatomical landmarks  Assessment  Sensory level: T5   Dermatomal levels determined by pinch or prick  Ease of block: easy  Patient's tolerance of the procedure: comfortable throughout block  Medications:  Bolus administered: 1.6 mL of 0.75 bupivacaine  Opioid administered: 10 mcg of   fentanyl  Additional Notes  Dilaudid 100 mcg

## 2017-09-21 NOTE — H&P
[facility]  Obstetrics  History & Physical    Patient Name: Lauren Griggs  MRN: [id number]  Admission Date: 2017  Primary Care Provider: Primary Doctor No    Subjective:     Principal Problem:Vaginal bleeding    History of Present Illness:  No notes on file        Obstetric History       T2      L2     SAB0   TAB0   Ectopic0   Multiple0   Live Births2       # Outcome Date GA Lbr Beto/2nd Weight Sex Delivery Anes PTL Lv   3 Current            2 Term     M CS-Unspec   SONIA   1 Term     F CS-Unspec   SONIA        Past Medical History:   Diagnosis Date    Abnormal Pap smear of cervix      Past Surgical History:   Procedure Laterality Date     SECTION         PTA Medications   Medication Sig    ferrous sulfate 325 mg (65 mg iron) Tab tablet Take 1 tablet (325 mg total) by mouth once daily.    PNV with calcium no.72-iron-FA 27 mg iron- 1 mg Tab Take 1 tablet by mouth once daily.       Review of patient's allergies indicates:  No Known Allergies     Family History     None        Social History Main Topics    Smoking status: Never Smoker    Smokeless tobacco: Never Used    Alcohol use No      Comment: socally     Drug use: No    Sexual activity: Yes     Partners: Male     Review of Systems   Constitutional: Negative for chills and fever.   Respiratory: Negative for shortness of breath and wheezing.    Cardiovascular: Negative for chest pain.   Gastrointestinal: Negative for abdominal pain, diarrhea, nausea and vomiting.   Genitourinary: Positive for vaginal bleeding. Negative for dysuria, hematuria, pelvic pain and vaginal pain.   Neurological: Negative for headaches.      Objective:     Vital Signs (Most Recent):  Temp: 97.9 °F (36.6 °C) (17 1546)  Pulse: 90 (17 1550)  Resp: 20 (17 1546)  BP: (!) 107/59 (17 1546)  SpO2: 100 % (17 1550) Vital Signs (24h Range):  Temp:  [97.9 °F (36.6 °C)-98 °F (36.7 °C)] 97.9 °F (36.6 °C)  Pulse:  [77-91]  90  Resp:  [18-20] 20  SpO2:  [100 %] 100 %  BP: (107-121)/(59-75) 107/59        There is no height or weight on file to calculate BMI.    FHT: Cat 1 (reassuring), Baseline 125, Mod BTBV, + Accel, - Decel  TOCO: Irregular    Physical Exam:   Constitutional: She is oriented to person, place, and time. She appears well-developed and well-nourished.     Eyes: EOM are normal.    Neck: Normal range of motion.    Cardiovascular: Normal rate.     Pulmonary/Chest: Effort normal. No respiratory distress. She has no wheezes.        Abdominal: Soft. She exhibits no distension. There is no tenderness. There is no rebound and no guarding.             Musculoskeletal: Normal range of motion.       Neurological: She is alert and oriented to person, place, and time.    Skin: Skin is warm and dry.    Psychiatric: She has a normal mood and affect. Her behavior is normal. Judgment and thought content normal.       Cervix:  Deferred     Significant Labs:  Lab Results   Component Value Date    GROUPTRH A POS 2017    HEPBSAG Negative 2017       I have personallly reviewed all pertinent lab results from the last 24 hours.    Assessment/Plan:     31 y.o. female  at 37w0d for:    * Vaginal bleeding    - Resolved however delivery is recommended given fetal anomaly  - Consents signed and to chart  - Epidural per Anesthesia  - Update labs  - Ancef OCTOR  - To OR for C/S. Case Request is in.   - Notify Staff  - Ultrasound performed, infant in vertex position.   - Post-Partum Hemorrhage risk - low              Rubella non-immune status, antepartum    - MMR PP        Truncus arteriosus, Wharton' type II    - NICU and pediatric cardiology aware            Vivian Mccloud MD  Obstetrics  [facility]

## 2017-09-21 NOTE — H&P
[facility]   Obstetrics  History & Physical    Patient Name: Lauren Griggs  MRN: [id number]  Admission Date: 2017  Primary Care Provider: Primary Doctor No    Subjective:     Principal Problem:Vaginal bleeding in pregnancy    History of Present Illness:  30 yo , 2 previous c/s, arrived at 37 wks gestation, NABIL 10/12/17 complaining of vaginal bleeding since 4AM    Obstetric HPI:  Patient reports some cramping, active fetal movement, Yes vaginal bleeding , No loss of fluid     This pregnancy has been complicated by previous c/s X 2, baby with cardiac defect-truncus arteriosis and needs to deliver in [place].    Obstetric History       T2      L2     SAB0   TAB0   Ectopic0   Multiple0   Live Births2       # Outcome Date GA Lbr Beto/2nd Weight Sex Delivery Anes PTL Lv   3 Current            2 Term     M CS-Unspec   SONIA   1 Term     F CS-Unspec   SONIA        Past Medical History:   Diagnosis Date    Abnormal Pap smear of cervix      Past Surgical History:   Procedure Laterality Date     SECTION         PTA Medications   Medication Sig    ferrous sulfate 325 mg (65 mg iron) Tab tablet Take 1 tablet (325 mg total) by mouth once daily.    PNV with calcium no.72-iron-FA 27 mg iron- 1 mg Tab Take 1 tablet by mouth once daily.       Review of patient's allergies indicates:  No Known Allergies     Family History     None        Social History Main Topics    Smoking status: Never Smoker    Smokeless tobacco: Never Used    Alcohol use No      Comment: socally     Drug use: No    Sexual activity: Yes     Partners: Male     Review of Systems   Objective:     Vital Signs (Most Recent):    Vital Signs (24h Range):  BP: (121)/(62) 121/62        There is no height or weight on file to calculate BMI.    FHT: Cat 1 (reassuring) 140s with accels, no decels  TOCO: Q 3-7 min    Physical Exam:   Constitutional: She is oriented to person, place, and time. She appears well-developed  and well-nourished.      Neck: Normal range of motion.     Pulmonary/Chest: Effort normal.        Abdominal: Soft.     Genitourinary:   Genitourinary Comments: + bright red bleeding, small amount noted in vaginal vault and on exam glove           Musculoskeletal: Normal range of motion.       Neurological: She is alert and oriented to person, place, and time. She has normal reflexes.    Skin: Skin is warm and dry.    Psychiatric: She has a normal mood and affect. Her behavior is normal.       Cervix:   Dilation:  0  Effacement:  60  Station: -3  Presentation: Vertex     Significant Labs:  Lab Results   Component Value Date    GROUPTRH A POS 2017    HEPBSAG Negative 2017     Labs and US pending.     Assessment/Plan:     31 y.o. female  at 37w0d for:    * Vaginal bleeding in pregnancy    Consulted with Dr. Garcia, consulting [facility], will continue EFM, IV fluids, labs and US ordered. emy Hobson CNM  Obstetrics  [facility]

## 2017-09-21 NOTE — HPI
30 yo , 2 previous c/s, arrived at 37 wks gestation, NABIL 10/12/17 complaining of vaginal bleeding since 4AM

## 2017-09-21 NOTE — H&P
[facility]  Obstetrics  History & Physical    Patient Name: Lauren Griggs  MRN: [id number]  Admission Date: 2017  Primary Care Provider: Primary Doctor No    Subjective:     Principal Problem:Vaginal bleeding    History of Present Illness:  Lauren Griggs is a 31 y.o. [id number] at 37w0d presents as transfer from [place].  Patient reported vaginal bleeding earlier today that occurred when she went to use the restroom. She denies clots. She denies bleeding since this episode.  She otherwise reports occasional contractions, denies LOF, reports good FM.    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         Obstetric History       T2      L2     SAB0   TAB0   Ectopic0   Multiple0   Live Births2       # Outcome Date GA Lbr Beto/2nd Weight Sex Delivery Anes PTL Lv   3 Current            2 Term     M CS-Unspec   SONIA   1 Term     F CS-Unspec   SONIA        Past Medical History:   Diagnosis Date    Abnormal Pap smear of cervix      Past Surgical History:   Procedure Laterality Date     SECTION         PTA Medications   Medication Sig    ferrous sulfate 325 mg (65 mg iron) Tab tablet Take 1 tablet (325 mg total) by mouth once daily.    PNV with calcium no.72-iron-FA 27 mg iron- 1 mg Tab Take 1 tablet by mouth once daily.       Review of patient's allergies indicates:  No Known Allergies     Family History     None        Social History Main Topics    Smoking status: Never Smoker    Smokeless tobacco: Never Used    Alcohol use No      Comment: socally     Drug use: No    Sexual activity: Yes     Partners: Male     Review of Systems   Constitutional: Negative for chills and fever.   Respiratory: Negative for shortness of breath and wheezing.    Cardiovascular: Negative for chest pain.    Gastrointestinal: Negative for abdominal pain, diarrhea, nausea and vomiting.   Genitourinary: Positive for vaginal bleeding. Negative for dysuria, hematuria, pelvic pain and vaginal pain.   Neurological: Negative for headaches.      Objective:     Vital Signs (Most Recent):  Temp: 97.9 °F (36.6 °C) (17 1546)  Pulse: 90 (17 1550)  Resp: 20 (17 1546)  BP: (!) 107/59 (17 1546)  SpO2: 100 % (17 1550) Vital Signs (24h Range):  Temp:  [97.9 °F (36.6 °C)-98 °F (36.7 °C)] 97.9 °F (36.6 °C)  Pulse:  [77-91] 90  Resp:  [18-20] 20  SpO2:  [100 %] 100 %  BP: (107-121)/(59-75) 107/59        There is no height or weight on file to calculate BMI.    FHT: Cat 1 (reassuring), Baseline 125, Mod BTBV, + Accel, - Decel  TOCO: Irregular    Physical Exam:   Constitutional: She is oriented to person, place, and time. She appears well-developed and well-nourished.     Eyes: EOM are normal.    Neck: Normal range of motion.    Cardiovascular: Normal rate.     Pulmonary/Chest: Effort normal. No respiratory distress. She has no wheezes.        Abdominal: Soft. She exhibits no distension. There is no tenderness. There is no rebound and no guarding.             Musculoskeletal: Normal range of motion.       Neurological: She is alert and oriented to person, place, and time.    Skin: Skin is warm and dry.    Psychiatric: She has a normal mood and affect. Her behavior is normal. Judgment and thought content normal.       Cervix:  Deferred     Significant Labs:  Lab Results   Component Value Date    GROUPTRH A POS 2017    HEPBSAG Negative 2017       I have personallly reviewed all pertinent lab results from the last 24 hours.    Assessment/Plan:     31 y.o. female  at 37w0d for:    * Vaginal bleeding    - Resolved however delivery is recommended given fetal anomaly  - Consents signed and to chart  - Epidural per Anesthesia  - Update labs  - Ancef OCTOR  - To OR for C/S. Case Request is in.   -  Notify Staff  - Ultrasound performed, infant in vertex position.   - Post-Partum Hemorrhage risk - low              Rubella non-immune status, antepartum    - MMR PP        Truncus arteriosus, Wharton' type II    - NICU and pediatric cardiology aware            Vivian Mccloud MD  Obstetrics  [facility]

## 2017-09-21 NOTE — PROCEDURES
Lauren Griggs is a 31 y.o. female patient.            Obtain Fetal nonstress test (NST)  Date/Time: 9/21/2017 1:28 PM  Performed by: ANABELA HOLLIS  Authorized by: ANABELA HOLLIS     Nonstress Test:     Variability:  6-25 BPM    Decelerations:  None    Accelerations:  15 bpm    Acoustic Stimulator: No      Baseline:  130    Uterine Irritability: No      Contractions:  Irregular  Biophysical Profile:     Nonstress Test Interpretation: reactive      Overall Impression:  Reassuring  Post-procedure:     Patient tolerance:  Patient tolerated the procedure well with no immediate complications        Anabela Hollis  9/21/2017

## 2017-09-21 NOTE — ANESTHESIA PREPROCEDURE EVALUATION
Pre-operative evaluation for Procedure(s) (LRB):  DELIVERY- SECTION (N/A)    Lauren Griggs is a 31 y.o. female 30 yo , 2 previous c/s, arrived at 37 wks gestation, NABIL 10/12/17 complaining of vaginal bleeding since 4AM. Baby has known cardiac anomaly.       Patient Active Problem List   Diagnosis    Encounter for supervision of normal pregnancy in second trimester    Anemia    Previous  section    Iron deficiency anemia    Uterine fibroid in pregnancy    DiGeorge syndrome    Truncus arteriosus, Wharton' type II    Vaginal bleeding in pregnancy    Vaginal bleeding       Review of patient's allergies indicates:  No Known Allergies     No current facility-administered medications on file prior to encounter.      Current Outpatient Prescriptions on File Prior to Encounter   Medication Sig Dispense Refill    ferrous sulfate 325 mg (65 mg iron) Tab tablet Take 1 tablet (325 mg total) by mouth once daily. 30 tablet 3    PNV with calcium no.72-iron-FA 27 mg iron- 1 mg Tab Take 1 tablet by mouth once daily.         Past Surgical History:   Procedure Laterality Date     SECTION         Social History     Social History    Marital status: Single     Spouse name: N/A    Number of children: N/A    Years of education: N/A     Occupational History    Not on file.     Social History Main Topics    Smoking status: Never Smoker    Smokeless tobacco: Never Used    Alcohol use No      Comment: socally     Drug use: No    Sexual activity: Yes     Partners: Male     Other Topics Concern    Not on file     Social History Narrative    No narrative on file         Vital Signs Range (Last 24H):  Temp:  [36.6 °C (97.9 °F)-36.7 °C (98 °F)]   Pulse:  [77-91]   Resp:  [18-20]   BP: (107-121)/(59-75)   SpO2:  [100 %]       CBC:   Recent Labs      17   1115   WBC  5.63   RBC  3.82*   HGB  11.0*   HCT  33.8*   PLT  176   MCV  89   MCH  28.8   MCHC  32.5       CMP: No results for  input(s): NA, K, CL, CO2, BUN, CREATININE, GLU, MG, PHOS, CALCIUM, ALBUMIN, PROT, ALKPHOS, ALT, AST, BILITOT in the last 72 hours.    INR  Recent Labs      09/21/17   1115   INR  1.0   APTT  33.6*           Diagnostic Studies:          Anesthesia Evaluation    I have reviewed the Patient Summary Reports.    I have reviewed the Nursing Notes.      Review of Systems  Anesthesia Hx:  No problems with previous Anesthesia  History of prior surgery of interest to airway management or planning: Denies Family Hx of Anesthesia complications.   Denies Personal Hx of Anesthesia complications.   Pulmonary:  Pulmonary Normal    Renal/:  Renal/ Normal     Hepatic/GI:  Hepatic/GI Normal    Neurological:  Neurology Normal        Physical Exam  General:  Well nourished    Airway/Jaw/Neck:  Airway Findings: Mouth Opening: Normal Tongue: Normal  General Airway Assessment: Adult  Mallampati: IV  Improves to III with phonation.  TM Distance: Normal, at least 6 cm      Dental:  Dental Findings: In tact        Mental Status:  Mental Status Findings:  Cooperative, Alert and Oriented         Anesthesia Plan  Type of Anesthesia, risks & benefits discussed:  Anesthesia Type:  spinal, CSE  Patient's Preference:   Intra-op Monitoring Plan:   Intra-op Monitoring Plan Comments:   Post Op Pain Control Plan:   Post Op Pain Control Plan Comments:   Induction:    Beta Blocker:  Patient is not currently on a Beta-Blocker (No further documentation required).       Informed Consent: Patient understands risks and agrees with Anesthesia plan.  Questions answered. Anesthesia consent signed with patient.  ASA Score: 2     Day of Surgery Review of History & Physical:    H&P update referred to the surgeon.         Ready For Surgery From Anesthesia Perspective.

## 2017-09-21 NOTE — SUBJECTIVE & OBJECTIVE
Obstetric History       T2      L2     SAB0   TAB0   Ectopic0   Multiple0   Live Births2       # Outcome Date GA Lbr Beto/2nd Weight Sex Delivery Anes PTL Lv   3 Current            2 Term     M CS-Unspec   SONIA   1 Term     F CS-Unspec   SONIA        Past Medical History:   Diagnosis Date    Abnormal Pap smear of cervix      Past Surgical History:   Procedure Laterality Date     SECTION         PTA Medications   Medication Sig    ferrous sulfate 325 mg (65 mg iron) Tab tablet Take 1 tablet (325 mg total) by mouth once daily.    PNV with calcium no.72-iron-FA 27 mg iron- 1 mg Tab Take 1 tablet by mouth once daily.       Review of patient's allergies indicates:  No Known Allergies     Family History     None        Social History Main Topics    Smoking status: Never Smoker    Smokeless tobacco: Never Used    Alcohol use No      Comment: socally     Drug use: No    Sexual activity: Yes     Partners: Male     Review of Systems   Constitutional: Negative for chills and fever.   Respiratory: Negative for shortness of breath and wheezing.    Cardiovascular: Negative for chest pain.   Gastrointestinal: Negative for abdominal pain, diarrhea, nausea and vomiting.   Genitourinary: Positive for vaginal bleeding. Negative for dysuria, hematuria, pelvic pain and vaginal pain.   Neurological: Negative for headaches.      Objective:     Vital Signs (Most Recent):  Temp: 97.9 °F (36.6 °C) (17 1546)  Pulse: 90 (17 1550)  Resp: 20 (17 1546)  BP: (!) 107/59 (17 1546)  SpO2: 100 % (17 1550) Vital Signs (24h Range):  Temp:  [97.9 °F (36.6 °C)-98 °F (36.7 °C)] 97.9 °F (36.6 °C)  Pulse:  [77-91] 90  Resp:  [18-20] 20  SpO2:  [100 %] 100 %  BP: (107-121)/(59-75) 107/59        There is no height or weight on file to calculate BMI.    FHT: Cat 1 (reassuring), Baseline 125, Mod BTBV, + Accel, - Decel  TOCO: Irregular    Physical Exam:   Constitutional: She is oriented to person,  place, and time. She appears well-developed and well-nourished.     Eyes: EOM are normal.    Neck: Normal range of motion.    Cardiovascular: Normal rate.     Pulmonary/Chest: Effort normal. No respiratory distress. She has no wheezes.        Abdominal: Soft. She exhibits no distension. There is no tenderness. There is no rebound and no guarding.             Musculoskeletal: Normal range of motion.       Neurological: She is alert and oriented to person, place, and time.    Skin: Skin is warm and dry.    Psychiatric: She has a normal mood and affect. Her behavior is normal. Judgment and thought content normal.       Cervix:  Deferred     Significant Labs:  Lab Results   Component Value Date    GROUPTRH A POS 09/21/2017    HEPBSAG Negative 03/09/2017       I have personallly reviewed all pertinent lab results from the last 24 hours.

## 2017-09-21 NOTE — HOSPITAL COURSE
EFM, pelvic exam bright red blood noted in vaginal vault, cx closed/60/soft  Consulted Dr. Garcia, this baby has truncus arteriosis and is to be delivered at [facility], [place], operated by [facility], labs and US pending.   Dr. Garcia consulting M in Prim

## 2017-09-21 NOTE — HPI
Lauren Griggs is a 31 y.o. [id number] at 37w0d presents as transfer from [place].  Patient reported vaginal bleeding earlier today that occurred when she went to use the restroom. She denies clots. She denies bleeding since this episode.  She otherwise reports occasional contractions, denies LOF, reports good FM.    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

## 2017-09-21 NOTE — ASSESSMENT & PLAN NOTE
- Resolved however delivery is recommended given fetal anomaly  - Consents signed and to chart  - Epidural per Anesthesia  - Update labs  - Ancef OCTOR  - To OR for C/S. Case Request is in.   - Notify Staff  - Ultrasound performed, infant in vertex position.   - Post-Partum Hemorrhage risk - low

## 2017-09-21 NOTE — PROGRESS NOTES
Chart reviewed. Discussed plan of management with pt. No active bleeding at this time (mostly noted on glove w/ cervical exam). Pt denies pain, appears well. Spoke w/ Dr. Baird. Will plan to transfer to [facility] & likely deliver. Nurse aware. Will notify transport team

## 2017-09-22 ENCOUNTER — PATIENT MESSAGE (OUTPATIENT)
Dept: OBSTETRICS AND GYNECOLOGY | Facility: CLINIC | Age: 32
End: 2017-09-22

## 2017-09-22 LAB
ALBUMIN SERPL BCP-MCNC: 2.3 G/DL
ALP SERPL-CCNC: 127 U/L
ALT SERPL W/O P-5'-P-CCNC: 12 U/L
ANION GAP SERPL CALC-SCNC: 9 MMOL/L
AST SERPL-CCNC: 18 U/L
BASOPHILS # BLD AUTO: 0.01 K/UL
BASOPHILS NFR BLD: 0.2 %
BILIRUB SERPL-MCNC: 0.4 MG/DL
BUN SERPL-MCNC: 7 MG/DL
CALCIUM SERPL-MCNC: 7.6 MG/DL
CHLORIDE SERPL-SCNC: 104 MMOL/L
CO2 SERPL-SCNC: 23 MMOL/L
CREAT SERPL-MCNC: 0.8 MG/DL
DIFFERENTIAL METHOD: ABNORMAL
EOSINOPHIL # BLD AUTO: 0.1 K/UL
EOSINOPHIL NFR BLD: 1.1 %
ERYTHROCYTE [DISTWIDTH] IN BLOOD BY AUTOMATED COUNT: 14.5 %
EST. GFR  (AFRICAN AMERICAN): >60 ML/MIN/1.73 M^2
EST. GFR  (NON AFRICAN AMERICAN): >60 ML/MIN/1.73 M^2
GLUCOSE SERPL-MCNC: 124 MG/DL
HCT VFR BLD AUTO: 29.1 %
HGB BLD-MCNC: 9.4 G/DL
LYMPHOCYTES # BLD AUTO: 1.1 K/UL
LYMPHOCYTES NFR BLD: 17.6 %
MCH RBC QN AUTO: 28.9 PG
MCHC RBC AUTO-ENTMCNC: 32.3 G/DL
MCV RBC AUTO: 90 FL
MONOCYTES # BLD AUTO: 0.3 K/UL
MONOCYTES NFR BLD: 4.1 %
NEUTROPHILS # BLD AUTO: 4.7 K/UL
NEUTROPHILS NFR BLD: 76.8 %
PLATELET # BLD AUTO: 183 K/UL
PMV BLD AUTO: 11.4 FL
POTASSIUM SERPL-SCNC: 3.5 MMOL/L
PROT SERPL-MCNC: 6 G/DL
RBC # BLD AUTO: 3.25 M/UL
RPR SER QL: NORMAL
SODIUM SERPL-SCNC: 136 MMOL/L
WBC # BLD AUTO: 6.12 K/UL

## 2017-09-22 PROCEDURE — 99231 SBSQ HOSP IP/OBS SF/LOW 25: CPT | Mod: ,,, | Performed by: OBSTETRICS & GYNECOLOGY

## 2017-09-22 PROCEDURE — 25000003 PHARM REV CODE 250: Performed by: STUDENT IN AN ORGANIZED HEALTH CARE EDUCATION/TRAINING PROGRAM

## 2017-09-22 PROCEDURE — 63600175 PHARM REV CODE 636 W HCPCS: Performed by: ANESTHESIOLOGY

## 2017-09-22 PROCEDURE — 85025 COMPLETE CBC W/AUTO DIFF WBC: CPT

## 2017-09-22 PROCEDURE — 11000001 HC ACUTE MED/SURG PRIVATE ROOM

## 2017-09-22 PROCEDURE — 36415 COLL VENOUS BLD VENIPUNCTURE: CPT

## 2017-09-22 PROCEDURE — 51702 INSERT TEMP BLADDER CATH: CPT

## 2017-09-22 PROCEDURE — 36000685 HC CESAREAN SECTION LEVEL I

## 2017-09-22 PROCEDURE — 25000003 PHARM REV CODE 250: Performed by: OBSTETRICS & GYNECOLOGY

## 2017-09-22 PROCEDURE — 80053 COMPREHEN METABOLIC PANEL: CPT

## 2017-09-22 PROCEDURE — 25000003 PHARM REV CODE 250: Performed by: ANESTHESIOLOGY

## 2017-09-22 RX ORDER — IRON POLYSACCHARIDE COMPLEX 150 MG
150 CAPSULE ORAL DAILY
Status: DISCONTINUED | OUTPATIENT
Start: 2017-09-22 | End: 2017-09-24 | Stop reason: HOSPADM

## 2017-09-22 RX ADMIN — ACETAMINOPHEN 650 MG: 325 TABLET ORAL at 06:09

## 2017-09-22 RX ADMIN — IBUPROFEN 600 MG: 600 TABLET, FILM COATED ORAL at 06:09

## 2017-09-22 RX ADMIN — HYDROCODONE BITARTRATE AND ACETAMINOPHEN 1 TABLET: 5; 325 TABLET ORAL at 10:09

## 2017-09-22 RX ADMIN — OXYCODONE HYDROCHLORIDE 5 MG: 5 TABLET ORAL at 05:09

## 2017-09-22 RX ADMIN — OXYCODONE HYDROCHLORIDE 5 MG: 5 TABLET ORAL at 12:09

## 2017-09-22 RX ADMIN — Medication 150 MG: at 09:09

## 2017-09-22 RX ADMIN — ACETAMINOPHEN 650 MG: 325 TABLET ORAL at 05:09

## 2017-09-22 RX ADMIN — SIMETHICONE CHEW TAB 80 MG 80 MG: 80 TABLET ORAL at 06:09

## 2017-09-22 RX ADMIN — ACETAMINOPHEN 650 MG: 325 TABLET ORAL at 12:09

## 2017-09-22 RX ADMIN — KETOROLAC TROMETHAMINE 30 MG: 30 INJECTION, SOLUTION INTRAMUSCULAR at 12:09

## 2017-09-22 RX ADMIN — KETOROLAC TROMETHAMINE 30 MG: 30 INJECTION, SOLUTION INTRAMUSCULAR at 05:09

## 2017-09-22 RX ADMIN — DIPHENHYDRAMINE HYDROCHLORIDE 25 MG: 25 CAPSULE ORAL at 04:09

## 2017-09-22 NOTE — NURSING
"Patient educated on benefits of breast milk and encouraged to pump and breast feed. Patients states "my plan is to use formula but I might try to pump in the morning. I don't feel like it tonight."   "

## 2017-09-22 NOTE — L&D DELIVERY NOTE
SUMMARY   Please see operative note by Dr. Baird for details.       Delivery Information for  Michael Griggs    Birth information:  YOB: 2018   Time of birth: 6:09 PM   Sex: male   Head Delivery Date/Time: 2017  6:09 PM   Delivery type: , Low Transverse   Gestational Age: 37w0d    Delivery Providers    Delivering clinician:  Khanh Baird III, MD   Other personnel:   Provider Role   Monique Hadley MD Resident   Sushma K Reddy, MD Resident   Demetrio Vera, LORI Circulator   Elizabet Alford, RN Registered Nurse   [facility]                    Assessment    Living status:  Living  Apgars:     1 Minute:   5 Minute:   10 Minute:   15 Minute:   20 Minute:     Skin Color:   0  1       Heart Rate:   2  2       Reflex Irritability:   2  2       Muscle Tone:   2  2       Respiratory Effort:   2  2       Total:   8  9               Apgars Assigned By:  [facility]         Assisted Delivery Details:    Forceps attempted?:  No  Vacuum extractor attempted?:  No         Shoulder Dystocia    Shoulder dystocia present?:  No           Presentation and Position    Presentation:   Vertex   Position:   Middle    Occiput    Anterior            Interventions/Resuscitation    Method:  NICU Attended       Cord    Vessels:  3 vessels  Complications:  None  Cord Blood Disposition:  Sent with Baby  Gases Sent?:  Yes  Stem Cell Collection (by MD):  No       Placenta    Date and time:  2017  6:11 PM  Removal:  Manual removal  Appearance:  Intact  Placenta disposition:  discarded           Labor Events:       labor: No     Labor Onset Date/Time:         Dilation Complete Date/Time:         Start Pushing Date/Time:       Rupture Date/Time:              Rupture type:           Fluid Amount:        Fluid Color:        Fluid Odor:        Membrane Status (PeriCalm):        Rupture Date/Time (PeriCalm):        Fluid Amount (PeriCalm):        Fluid Color (PeriCalm):          steroids:       Antibiotics given for GBS: No     Induction: none     Indications for induction:        Augmentation:       Indications for augmentation:       Labor complications: None     Additional complications:          Cervical ripening:                     Delivery:      Episiotomy: None     Indication for Episiotomy:       Perineal Lacerations: None Repaired:      Periurethral Laceration: none Repaired:     Labial Laceration: none Repaired:     Sulcus Laceration: none Repaired:     Vaginal Laceration: No Repaired:     Cervical Laceration: No Repaired:     Repair suture:       Repair # of packets:       Vaginal delivery QBL (mL): 0      QBL (mL): 520     Combined Blood Loss (mL): 520     Vaginal Sweep Performed: No     Surgicount Correct: Yes       Other providers:       Anesthesia    Method:  Spinal          Details (if applicable):  Trial of Labor No    Categorization: Repeat    Priority: Routine   Indications for : Known/Suspected Fetal Anomaly   Incision Type: low transverse     Additional  information:  Forceps:    Vacuum:    Breech:    Observed anomalies    Other (Comments):

## 2017-09-22 NOTE — OP NOTE
"DATE OF PROCEDURE:  2017.    PREOPERATIVE DIAGNOSES:  1.  Intrauterine pregnancy at 37 weeks.  2.  Fetus with truncus arteriosus.  3.  Two prior  sections.  4.  Suspected abruptio placenta.    POSTOPERATIVE DIAGNOSES:  1.  Intrauterine pregnancy at 37 weeks.  2.  Fetus with truncus arteriosus.  3.  Two prior  sections.  4.  Suspected abruptio placenta.    OPERATIVE PROCEDURE:  Repeat  section and bilateral tubal ligation.    :  Khanh Baird III, M.D.    ASSISTANT:  Sushma Reddy, MD and Monique Hadley MD    ANESTHESIA:  Spinal.    ESTIMATED BLOOD LOSS:  550 mL    The baby was a 2860 g male infant.  The placenta was intact.  The lower uterine   segment was "paper thin".    CLINICAL HISTORY:  This is a 31-year-old -American female,  3,   para 2, with an NABIL of 10/12/2017.  The patient has been followed by   Maternal-Fetal Medicine after discovering the fetal cardiac defect.    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  The patient had planned for a repeat    section here at [facility] on 10/05/2016, in order to have [facility]   cardiovascular surgery immediately available.    The patient began bleeding and cramping this afternoon and reported to the [facility] Delivery suite and because of the suspected abruption and because we   had reached 37 weeks,  we decided to transport her with plans for delivery.    The patient was taken to the Operating Room where she received her spinal   anesthetic.  Next, she was placed supine, a Del Angel catheter was inserted, and the abdomen was   prepped and draped.    A Pfannenstiel incision was made in the abdomen and carried down in layers to   the peritoneal cavity.  There was dense scarring and very little rectus muscle.    The fascia was reflected inferiorly and superiorly and the peritoneal cavity   was easily entered.  A bladder flap was " performed in the lower uterine segment and   the entire lower uterine segment was about 3 mm in thickness and was ballooning.    A transverse incision was made in the lower uterine segment and extended   bluntly with finger fracture.    A 2860 g male infant was delivered from this atraumatically from the cephalic   presentation without difficulty.  There was one nuchal cord.  The cord was   clamped and cut and the baby was handed to our Intensive Care Nursery team.    The placenta was delivered with fundal massage and then the uterus was   exteriorized and thoroughly cleaned.  The uterine incision was closed in 1 layer   with a #1 chromic.  The tubes and ovaries were then inspected and found to be   normal.    A bilateral tubal ligation was carried out in the ampullary portion of each tube   via modified Hanover technique.  The uterus was then placed back into the   abdominal cavity.  The gutters were washed and suctioned.    The peritoneum was closed with a 2-0 Vicryl.  The fascia was closed with a #1   PDS.  There was very little subcutaneous tissue and so, we went straight to the   skin and Dr. Burr closed the skin with a 4-0 Monocryl in a running subcuticular   fashion.  We did place a pressure dressing for added security.  The patient   tolerated the procedure well and is going to Recovery in good condition.      AGR/HN  dd: 09/21/2017 19:15:13 (CDT)  td: 09/22/2017 03:17:56 (CDT)  Doc ID   #[id number]  Job ID #[id number]    CC:

## 2017-09-22 NOTE — SUBJECTIVE & OBJECTIVE
Hospital course: HD # 1: Admitted as transfer for vaginal bleeding in setting of fetal anomaly. Plan for rLTCS with BTL  HD # 2: POD # 1 s/p RLTCS/BTL - Patient doing well. Infant in NICU. No complaints.  Making PO advances    Interval History:     She is doing well this morning. She has not eaten yet. She is not voiding spontaneously, saldana recently removed. She is not ambulating. She has not passed flatus, and has not a BM. Vaginal bleeding is mild. She denies fever or chills. Abdominal pain is mild and controlled with oral medications. She is breastfeeding. She desires circumcision for her male baby: not applicable.    Objective:     Vital Signs (Most Recent):  Temp: 98.6 °F (37 °C) (09/22/17 0400)  Pulse: 70 (09/22/17 0400)  Resp: 18 (09/22/17 0400)  BP: 109/64 (09/22/17 0400)  SpO2: 98 % (09/22/17 0400) Vital Signs (24h Range):  Temp:  [96.8 °F (36 °C)-98.6 °F (37 °C)] 98.6 °F (37 °C)  Pulse:  [60-91] 70  Resp:  [18-20] 18  SpO2:  [97 %-100 %] 98 %  BP: (107-128)/(56-82) 109/64     Weight: 71.6 kg (157 lb 13.6 oz)  Body mass index is 28.86 kg/m².      Intake/Output Summary (Last 24 hours) at 09/22/17 0658  Last data filed at 09/22/17 0551   Gross per 24 hour   Intake              650 ml   Output             1730 ml   Net            -1080 ml       Significant Labs:  Lab Results   Component Value Date    GROUPTRH A POS 09/21/2017    HEPBSAG Negative 03/09/2017       Recent Labs  Lab 09/22/17  0545   HGB 9.4*   HCT 29.1*       CBC:   Recent Labs  Lab 09/22/17  0545   WBC 6.12   RBC 3.25*   HGB 9.4*   HCT 29.1*      MCV 90   MCH 28.9   MCHC 32.3     I have personallly reviewed all pertinent lab results from the last 24 hours.    Physical Exam:   Constitutional: She is oriented to person, place, and time. She appears well-developed and well-nourished. No distress.    HENT:   Head: Normocephalic and atraumatic.    Eyes: Conjunctivae are normal.    Neck: Neck supple.    Cardiovascular: Normal rate, regular  rhythm and intact distal pulses.     Pulmonary/Chest: Effort normal. No respiratory distress.        Abdominal: Soft. Bowel sounds are normal. She exhibits distension (Abd moderately distended. No signs of acute abdomen) and abdominal incision (Mild strikethrough, no signs of infection). There is no tenderness. There is no rebound and no guarding.                 Neurological: She is alert and oriented to person, place, and time.    Skin: Skin is warm and dry. She is not diaphoretic.    Psychiatric: She has a normal mood and affect. Her behavior is normal. Judgment and thought content normal.

## 2017-09-22 NOTE — ANESTHESIA POSTPROCEDURE EVALUATION
"Anesthesia Post Evaluation    Patient: Lauren Griggs    Procedure(s) Performed: Procedure(s) (LRB):  DELIVERY- SECTION (N/A)    Final Anesthesia Type: regional  Patient location during evaluation: floor  Patient participation: Yes- Able to Participate  Level of consciousness: awake and alert  Post-procedure vital signs: reviewed and stable  Pain management: adequate  Airway patency: patent  PONV status at discharge: No PONV  Anesthetic complications: no      Cardiovascular status: blood pressure returned to baseline  Respiratory status: unassisted  Hydration status: euvolemic  Follow-up not needed.        Visit Vitals  /71   Pulse 73   Temp 36.7 °C (98 °F) (Oral)   Resp 18   Ht 5' 2.01" (1.575 m)   Wt 71.6 kg (157 lb 13.6 oz)   LMP 2017   SpO2 97%   Breastfeeding? Unknown   BMI 28.86 kg/m²       Pain/Gage Score: Pain Rating Prior to Med Admin: 4 (2017 12:53 PM)  Pain Rating Post Med Admin: 0 (2017  1:03 AM)      "

## 2017-09-22 NOTE — PHYSICIAN QUERY
"PT Name: Lauren Griggs  MR #: [id number]    Physician Query Form - Hematology Clarification      CDS/: JON Jacob,RNC-[email or web address]        Contact information:[phone]    This form is a permanent document in the medical record.      Query Date: 2017    By submitting this query, we are merely seeking further clarification of documentation. Please utilize your independent clinical judgment when addressing the question(s) below.    The Medical record contains the following:   Indicators  Supporting Clinical Findings Location in Medical Record   X "Anemia" documented Anemia OB Progress note @703am   X H & H = Hgb=9.4-11.0  Hct=29.1-33.8 LAB -    BP =                     HR=      "GI bleeding" documented     X Acute bleeding (Non GI site) ESTIMATED BLOOD LOSS:  550 mL OP note     Transfusion(s)     X Treatment: Will start Fe and Colace OB Progress note @703am   X Other:  OPERATIVE PROCEDURE:  Repeat  section and bilateral tubal ligation. OP note      Provider, please specify diagnosis or diagnoses associated with above clinical findings.    [ x ] Acute blood loss anemia expected post-operatively  [  ] Acute blood loss anemia  [ x ] Iron deficiency anemia  [  ] Other Hematological Diagnosis (please specify): _________________________________  [  ] Clinically Undetermined       Please document in your progress notes daily for the duration of treatment, until resolved, and include in your discharge summary.                                                                                                      "

## 2017-09-22 NOTE — PROGRESS NOTES
Mother encouraged to provide breastmilk for her NICU baby. Benefits of breastmilk discussed with mother. Mother declines pumping at this time. Mother encouraged to inform nurse if she changes her mind.

## 2017-09-22 NOTE — PROGRESS NOTES
[facility]  Obstetrics  Postpartum Progress Note    Patient Name: Lauren Griggs  MRN: [id number]  Admission Date: 2017  Hospital Length of Stay: 1 days  Attending Physician: Khanh Baird III,*  Primary Care Provider: Primary Doctor No    Subjective:     Principal Problem:Status post repeat low transverse  section    Hospital course: HD # 1: Admitted as transfer for vaginal bleeding in setting of fetal anomaly. Plan for rLTCS with BTL  HD # 2: POD # 1 s/p RLTCS/BTL - Patient doing well. Infant in NICU. No complaints.  Making PO advances    Interval History:     She is doing well this morning. She has not eaten yet. She is not voiding spontaneously, saldana recently removed. She is not ambulating. She has not passed flatus, and has not a BM. Vaginal bleeding is mild. She denies fever or chills. Abdominal pain is mild and controlled with oral medications. She is breastfeeding. She desires circumcision for her male baby: not applicable.    Objective:     Vital Signs (Most Recent):  Temp: 98.6 °F (37 °C) (17 0400)  Pulse: 70 (17 0400)  Resp: 18 (17 0400)  BP: 109/64 (17 0400)  SpO2: 98 % (17 0400) Vital Signs (24h Range):  Temp:  [96.8 °F (36 °C)-98.6 °F (37 °C)] 98.6 °F (37 °C)  Pulse:  [60-91] 70  Resp:  [18-20] 18  SpO2:  [97 %-100 %] 98 %  BP: (107-128)/(56-82) 109/64     Weight: 71.6 kg (157 lb 13.6 oz)  Body mass index is 28.86 kg/m².      Intake/Output Summary (Last 24 hours) at 17 0658  Last data filed at 17 0551   Gross per 24 hour   Intake              650 ml   Output             1730 ml   Net            -1080 ml       Significant Labs:  Lab Results   Component Value Date    GROUPTRH A POS 2017    HEPBSAG Negative 2017       Recent Labs  Lab 17  0545   HGB 9.4*   HCT 29.1*       CBC:   Recent Labs  Lab 17  0545   WBC 6.12   RBC 3.25*   HGB 9.4*   HCT 29.1*      MCV 90   MCH 28.9   MCHC 32.3     I  have personallly reviewed all pertinent lab results from the last 24 hours.    Physical Exam:   Constitutional: She is oriented to person, place, and time. She appears well-developed and well-nourished. No distress.    HENT:   Head: Normocephalic and atraumatic.    Eyes: Conjunctivae are normal.    Neck: Neck supple.    Cardiovascular: Normal rate, regular rhythm and intact distal pulses.     Pulmonary/Chest: Effort normal. No respiratory distress.        Abdominal: Soft. Bowel sounds are normal. She exhibits distension (Abd moderately distended. No signs of acute abdomen) and abdominal incision (Mild strikethrough, no signs of infection). There is no tenderness. There is no rebound and no guarding.                 Neurological: She is alert and oriented to person, place, and time.    Skin: Skin is warm and dry. She is not diaphoretic.    Psychiatric: She has a normal mood and affect. Her behavior is normal. Judgment and thought content normal.       Assessment/Plan:     31 y.o. female  for:    * Status post repeat low transverse  section    Postpartum care:  - Patient doing well. Continue routine management and advances.  - Will transition Pablo pain meds. Pain well controlled.  - Encourage ambulation.   - Heme: Pre Delivery h/h  --> Post Delivery h/h 9.  - Contraception - s/p BTL  - Lactation - The patient is pumping feeding.   - Rh Status - A POS  - Will follow PO advances today            Rubella non-immune status, antepartum    - MMR PP        Truncus arteriosus, Wharton' type II    - NICU and pediatric cardiology aware        Anemia    - Will start Fe and Colace            Disposition: As patient meets milestones, will plan to discharge POD 3-4.    Mary Givens MD  Obstetrics  [facility]

## 2017-09-22 NOTE — ASSESSMENT & PLAN NOTE
Postpartum care:  - Patient doing well. Continue routine management and advances.  - Will transition Pablo pain meds. Pain well controlled.  - Encourage ambulation.   - Heme: Pre Delivery h/h 11/33 --> Post Delivery h/h 9.4/29  - Contraception - s/p BTL  - Lactation - The patient is pumping feeding.   - Rh Status - A POS  - Will follow PO advances today

## 2017-09-22 NOTE — TRANSFER OF CARE
"Anesthesia Transfer of Care Note    Patient: Lauren Griggs    Procedure(s) Performed: Procedure(s) (LRB):  DELIVERY- SECTION (N/A)    Patient location: PACU    Anesthesia Type: spinal    Transport from OR: Transported from OR on room air with adequate spontaneous ventilation    Post pain: adequate analgesia    Post assessment: no apparent anesthetic complications    Post vital signs: stable    Level of consciousness: awake, alert and oriented    Nausea/Vomiting: no nausea/vomiting    Complications: none    Transfer of care protocol was followed      Last vitals:   Visit Vitals  BP (!) 107/59   Pulse 77   Temp 36.6 °C (97.9 °F) (Oral)   Resp 20   Ht 5' 2.01" (1.575 m)   Wt 71.6 kg (157 lb 13.6 oz)   LMP 2017   SpO2 100%   Breastfeeding? No   BMI 28.86 kg/m²     "

## 2017-09-23 ENCOUNTER — PATIENT MESSAGE (OUTPATIENT)
Dept: PEDIATRIC CARDIOLOGY | Facility: CLINIC | Age: 32
End: 2017-09-23

## 2017-09-23 PROCEDURE — 99231 SBSQ HOSP IP/OBS SF/LOW 25: CPT | Mod: ,,, | Performed by: OBSTETRICS & GYNECOLOGY

## 2017-09-23 PROCEDURE — 25000003 PHARM REV CODE 250: Performed by: STUDENT IN AN ORGANIZED HEALTH CARE EDUCATION/TRAINING PROGRAM

## 2017-09-23 PROCEDURE — 11000001 HC ACUTE MED/SURG PRIVATE ROOM

## 2017-09-23 PROCEDURE — 25000003 PHARM REV CODE 250: Performed by: OBSTETRICS & GYNECOLOGY

## 2017-09-23 RX ORDER — HYDROCODONE BITARTRATE AND ACETAMINOPHEN 5; 325 MG/1; MG/1
1 TABLET ORAL EVERY 4 HOURS PRN
Qty: 30 TABLET | Refills: 0 | Status: SHIPPED | OUTPATIENT
Start: 2017-09-23 | End: 2018-04-13

## 2017-09-23 RX ORDER — IBUPROFEN 600 MG/1
600 TABLET ORAL EVERY 6 HOURS
Qty: 30 TABLET | Refills: 0 | Status: SHIPPED | OUTPATIENT
Start: 2017-09-23 | End: 2018-04-13

## 2017-09-23 RX ADMIN — IBUPROFEN 600 MG: 600 TABLET, FILM COATED ORAL at 12:09

## 2017-09-23 RX ADMIN — HYDROCODONE BITARTRATE AND ACETAMINOPHEN 1 TABLET: 10; 325 TABLET ORAL at 06:09

## 2017-09-23 RX ADMIN — IBUPROFEN 600 MG: 600 TABLET, FILM COATED ORAL at 11:09

## 2017-09-23 RX ADMIN — Medication 150 MG: at 11:09

## 2017-09-23 RX ADMIN — IBUPROFEN 600 MG: 600 TABLET, FILM COATED ORAL at 05:09

## 2017-09-23 RX ADMIN — HYDROCODONE BITARTRATE AND ACETAMINOPHEN 1 TABLET: 5; 325 TABLET ORAL at 03:09

## 2017-09-23 NOTE — PROGRESS NOTES
[facility]  Obstetrics  Postpartum Progress Note    Patient Name: Lauren Griggs  MRN: [id number]  Admission Date: 2017  Hospital Length of Stay: 2 days  Attending Physician: Khanh Baird III,*  Primary Care Provider: Primary Doctor No    Subjective:     Principal Problem:Status post repeat low transverse  section    Hospital course: HD # 1: Admitted as transfer for vaginal bleeding in setting of fetal anomaly. Plan for rLTCS with BTL  HD # 2: POD # 1 s/p RLTCS/BTL - Patient doing well. Infant in NICU. No complaints.  Making PO advances  HD # 3: POD # 2 s/p RLTCS/BTL - Patient doing well. Infant in NICU. No complaints.  Making PO advances    Interval History:     She is doing well this morning. She is tolerating a regular diet without nausea or vomiting. She is voiding spontaneously. She is ambulating. She has passed flatus. Vaginal bleeding is mild. She denies fever or chills. Abdominal pain is mild and controlled with oral medications. She is not breastfeeding.      Objective:     Vital Signs (Most Recent):  Temp: 98.2 °F (36.8 °C) (17)  Pulse: 71 (17)  Resp: 18 (17)  BP: 101/62 (17)  SpO2: 98 % (17 1718) Vital Signs (24h Range):  Temp:  [98.2 °F (36.8 °C)-98.7 °F (37.1 °C)] 98.2 °F (36.8 °C)  Pulse:  [71-97] 71  Resp:  [18] 18  SpO2:  [98 %] 98 %  BP: (100-101)/(62-67) 101/62     Weight: 71.6 kg (157 lb 13.6 oz)  Body mass index is 28.86 kg/m².      Intake/Output Summary (Last 24 hours) at 17 0933  Last data filed at 17 1700   Gross per 24 hour   Intake                0 ml   Output              275 ml   Net             -275 ml       Significant Labs:  Lab Results   Component Value Date    GROUPTRH A POS 2017    HEPBSAG Negative 2017       Recent Labs  Lab 17  0545   HGB 9.4*   HCT 29.1*       I have personallly reviewed all pertinent lab results from the last 24 hours.    Physical Exam:    Constitutional: She is oriented to person, place, and time. She appears well-developed and well-nourished. No distress.    HENT:   Head: Normocephalic and atraumatic.     Neck: Normal range of motion. Neck supple.    Cardiovascular: Normal rate and regular rhythm.     Pulmonary/Chest: Effort normal and breath sounds normal.        Abdominal: Soft. She exhibits abdominal incision (c/d/i). She exhibits no distension. There is no tenderness. There is no guarding.                 Neurological: She is alert and oriented to person, place, and time.    Skin: Skin is warm and dry.    Psychiatric: She has a normal mood and affect. Her behavior is normal.       Assessment/Plan:     31 y.o. female  for:    * Status post repeat low transverse  section    Postpartum care:  - Patient doing well. Continue routine management and advances.  - Will transition Pablo pain meds. Pain well controlled.  - Encourage ambulation.   - Heme: Pre Delivery h/h  --> Post Delivery h/h 9.4/  - Contraception - s/p BTL  - Lactation - The patient is not breastfeeding.   - Rh Status - A POS  - Will follow PO advances today            Rubella non-immune status, antepartum    - MMR PP        Truncus arteriosus, Wharton' type II    - Baby in NICU        Anemia    - Will start Fe and Colace            Disposition: As patient meets milestones, will plan to discharge POD#4.    Kae Chery MD  Obstetrics  [facility], [place], operated by [facility]

## 2017-09-23 NOTE — SUBJECTIVE & OBJECTIVE
Hospital course: HD # 1: Admitted as transfer for vaginal bleeding in setting of fetal anomaly. Plan for rLTCS with BTL  HD # 2: POD # 1 s/p RLTCS/BTL - Patient doing well. Infant in NICU. No complaints.  Making PO advances  HD # 3: POD # 2 s/p RLTCS/BTL - Patient doing well. Infant in NICU. No complaints.  Making PO advances    Interval History:     She is doing well this morning. She is tolerating a regular diet without nausea or vomiting. She is voiding spontaneously. She is ambulating. She has passed flatus. Vaginal bleeding is mild. She denies fever or chills. Abdominal pain is mild and controlled with oral medications. She is not breastfeeding.      Objective:     Vital Signs (Most Recent):  Temp: 98.2 °F (36.8 °C) (09/23/17 0139)  Pulse: 71 (09/23/17 0139)  Resp: 18 (09/23/17 0139)  BP: 101/62 (09/23/17 0139)  SpO2: 98 % (09/22/17 1718) Vital Signs (24h Range):  Temp:  [98.2 °F (36.8 °C)-98.7 °F (37.1 °C)] 98.2 °F (36.8 °C)  Pulse:  [71-97] 71  Resp:  [18] 18  SpO2:  [98 %] 98 %  BP: (100-101)/(62-67) 101/62     Weight: 71.6 kg (157 lb 13.6 oz)  Body mass index is 28.86 kg/m².      Intake/Output Summary (Last 24 hours) at 09/23/17 0933  Last data filed at 09/22/17 1700   Gross per 24 hour   Intake                0 ml   Output              275 ml   Net             -275 ml       Significant Labs:  Lab Results   Component Value Date    GROUPTRH A POS 09/21/2017    HEPBSAG Negative 03/09/2017       Recent Labs  Lab 09/22/17  0545   HGB 9.4*   HCT 29.1*       I have personallly reviewed all pertinent lab results from the last 24 hours.    Physical Exam:   Constitutional: She is oriented to person, place, and time. She appears well-developed and well-nourished. No distress.    HENT:   Head: Normocephalic and atraumatic.     Neck: Normal range of motion. Neck supple.    Cardiovascular: Normal rate and regular rhythm.     Pulmonary/Chest: Effort normal and breath sounds normal.        Abdominal: Soft. She exhibits  abdominal incision (c/d/i). She exhibits no distension. There is no tenderness. There is no guarding.                 Neurological: She is alert and oriented to person, place, and time.    Skin: Skin is warm and dry.    Psychiatric: She has a normal mood and affect. Her behavior is normal.

## 2017-09-23 NOTE — ASSESSMENT & PLAN NOTE
Postpartum care:  - Patient doing well. Continue routine management and advances.  - Will transition Pablo pain meds. Pain well controlled.  - Encourage ambulation.   - Heme: Pre Delivery h/h 11/33 --> Post Delivery h/h 9.4/29  - Contraception - s/p BTL  - Lactation - The patient is not breastfeeding.   - Rh Status - A POS  - Will follow PO advances today

## 2017-09-24 VITALS
SYSTOLIC BLOOD PRESSURE: 107 MMHG | HEIGHT: 62 IN | DIASTOLIC BLOOD PRESSURE: 75 MMHG | HEART RATE: 81 BPM | WEIGHT: 157.88 LBS | TEMPERATURE: 98 F | RESPIRATION RATE: 18 BRPM | OXYGEN SATURATION: 98 % | BODY MASS INDEX: 29.05 KG/M2

## 2017-09-24 PROCEDURE — 25000003 PHARM REV CODE 250: Performed by: STUDENT IN AN ORGANIZED HEALTH CARE EDUCATION/TRAINING PROGRAM

## 2017-09-24 PROCEDURE — 99231 SBSQ HOSP IP/OBS SF/LOW 25: CPT | Mod: ,,, | Performed by: OBSTETRICS & GYNECOLOGY

## 2017-09-24 PROCEDURE — 25000003 PHARM REV CODE 250: Performed by: OBSTETRICS & GYNECOLOGY

## 2017-09-24 RX ADMIN — Medication 150 MG: at 01:09

## 2017-09-24 RX ADMIN — IBUPROFEN 600 MG: 600 TABLET, FILM COATED ORAL at 06:09

## 2017-09-24 RX ADMIN — HYDROCODONE BITARTRATE AND ACETAMINOPHEN 1 TABLET: 5; 325 TABLET ORAL at 06:09

## 2017-09-24 RX ADMIN — IBUPROFEN 600 MG: 600 TABLET, FILM COATED ORAL at 12:09

## 2017-09-24 RX ADMIN — IBUPROFEN 600 MG: 600 TABLET, FILM COATED ORAL at 01:09

## 2017-09-24 RX ADMIN — HYDROCODONE BITARTRATE AND ACETAMINOPHEN 1 TABLET: 5; 325 TABLET ORAL at 12:09

## 2017-09-24 NOTE — SUBJECTIVE & OBJECTIVE
Hospital course: HD # 1: Admitted as transfer for vaginal bleeding in setting of fetal anomaly. Plan for rLTCS with BTL  HD # 2: POD # 1 s/p RLTCS/BTL - Patient doing well. Infant in NICU. No complaints.  Making PO advances  HD # 3: POD # 2 s/p RLTCS/BTL - Patient doing well. Infant in NICU. No complaints.  Making PO advances    Interval History:     She is doing well this morning. She is tolerating a regular diet without nausea or vomiting. She is voiding spontaneously. She is ambulating. She has passed flatus, and has not a BM. Vaginal bleeding is mild. She denies fever or chills. Abdominal pain is mild and controlled with oral medications. She is not breastfeeding. Baby in NICU.    Objective:     Vital Signs (Most Recent):  Temp: 97.9 °F (36.6 °C) (09/24/17 0000)  Pulse: 85 (09/24/17 0000)  Resp: 18 (09/24/17 0000)  BP: 105/66 (09/24/17 0000)  SpO2: 96 % (09/24/17 0000) Vital Signs (24h Range):  Temp:  [97.9 °F (36.6 °C)-98.2 °F (36.8 °C)] 97.9 °F (36.6 °C)  Pulse:  [77-94] 85  Resp:  [18] 18  SpO2:  [96 %-97 %] 96 %  BP: (104-117)/(57-71) 105/66     Weight: 71.6 kg (157 lb 13.6 oz)  Body mass index is 28.86 kg/m².    No intake or output data in the 24 hours ending 09/24/17 0655    Significant Labs:  Lab Results   Component Value Date    GROUPTRH A POS 09/21/2017    HEPBSAG Negative 03/09/2017     No results for input(s): HGB, HCT in the last 48 hours.    Recent Labs  Lab 09/21/17  1115 09/22/17  0545   WBC 5.63 6.12   HGB 11.0* 9.4*   HCT 33.8* 29.1*   MCV 89 90    183      I have personallly reviewed all pertinent lab results from the last 24 hours.    Physical Exam:   Constitutional: She is oriented to person, place, and time. She appears well-developed and well-nourished. No distress.    HENT:   Head: Normocephalic and atraumatic.     Neck: Normal range of motion. Neck supple.    Cardiovascular: Normal rate and regular rhythm.     Pulmonary/Chest: Effort normal and breath sounds normal.         Abdominal: Soft. She exhibits abdominal incision (c/d/i). She exhibits no distension. There is no tenderness. There is no guarding.                 Neurological: She is alert and oriented to person, place, and time.    Skin: Skin is warm and dry.    Psychiatric: She has a normal mood and affect. Her behavior is normal.

## 2017-09-24 NOTE — DISCHARGE SUMMARY
[facility]  Obstetrics  Discharge Summary      Patient Name: Lauren Griggs  MRN: [id number]  Admission Date: 2017  Hospital Length of Stay: 3 days  Discharge Date and Time:  2017 11:27 AM  Attending Physician: Khanh Baird III,*   Discharging Provider: Alecia Arenas MD  Primary Care Provider: Primary Doctor No    HPI: Lauren Griggs is a 31 y.o. [id number] at 37w0d presents as transfer from [place].  Patient reported vaginal bleeding earlier today that occurred when she went to use the restroom. She denies clots. She denies bleeding since this episode.  She otherwise reports occasional contractions, denies LOF, reports good FM.    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     Procedure(s) (LRB):  DELIVERY- SECTION (N/A)     Hospital Course:   HD # 1: Admitted as transfer for vaginal bleeding in setting of fetal anomaly. Plan for rLTCS with BTL  HD # 2: POD # 1 s/p RLTCS/BTL - Patient doing well. Infant in NICU. No complaints.  Making PO advances  HD # 3: POD # 2 s/p RLTCS/BTL - Patient doing well. Infant in NICU. No complaints.  Making PO advances  HD # 4: POD # 3 s/p RLTCS/BTL - Patient doing well. Infant in NICU. No complaints.  Making routine advances        Final Active Diagnoses:    Diagnosis Date Noted POA    PRINCIPAL PROBLEM:  Status post repeat low transverse  section [Z98.891] 2017 Not Applicable    Rubella non-immune status, antepartum [O99.89, Z28.3] 2017 Not Applicable    Truncus arteriosus, Wharton' type II [Q20.0] 2017 Not Applicable    Anemia [D64.9] 2017 Yes      Problems Resolved During this Admission:    Diagnosis Date Noted Date Resolved POA    Vaginal bleeding [N93.9] 2017 Yes    Vaginal bleeding [N93.9] 2017  2017 Yes        Labs: CBC No results for input(s): WBC, HGB, HCT, PLT in the last 48 hours.    Feeding Method: breast    Immunizations     Date Immunization Status Dose Route/Site Given by    17 1113 MMR Deferred 0.5 mL Subcutaneous/Left deltoid Jesica Marinelli RN    17 Tdap Incomplete 0.5 mL Intramuscular/Left deltoid           Delivery:    Episiotomy: None   Lacerations: None   Repair suture:     Repair # of packets:     Blood loss (ml): 0     Birth information:  YOB: 2018   Time of birth: 6:09 PM   Sex: male   Delivery type: , Low Transverse   Gestational Age: 37w0d    Delivery Clinician:      Other providers:       Additional  information:  Forceps:    Vacuum:    Breech:    Observed anomalies      Living?:           APGARS  One minute Five minutes Ten minutes   Skin color:         Heart rate:         Grimace:         Muscle tone:         Breathing:         Totals: 8  9        Placenta: Delivered:       appearance    Pending Diagnostic Studies:     None          Discharged Condition: good    Disposition: Home or Self Care    Follow Up:  Follow-up Information     Khanh Baird Iii, MD In 10 days.    Specialty:  Maternal and Fetal Medicine  Why:  Postpartum visit, BP check  Contact information:  [address] [facility] [address]  [phone]             Tiffany Garcia MD In 6 weeks.    Specialty:  Obstetrics and Gynecology  Why:  Postpartum visit  Contact information:  [address] [facility] [address]  [phone]                 Patient Instructions:     Diet general     Activity as tolerated     Call MD for:  temperature >100.4     Call MD for:  persistent nausea and vomiting or diarrhea     Call MD for:  severe uncontrolled pain     Call MD for:  difficulty breathing or increased cough     Call MD for:  redness, tenderness, or signs of infection (pain, swelling, redness, odor or green/yellow discharge around incision site)     Call MD for:  severe  persistent headache     Call MD for:  persistent dizziness, light-headedness, or visual disturbances     Call MD for:  increased confusion or weakness       Medications:  Current Discharge Medication List      START taking these medications    Details   hydrocodone-acetaminophen 5-325mg (NORCO) 5-325 mg per tablet Take 1 tablet by mouth every 4 (four) hours as needed.  Qty: 30 tablet, Refills: 0    Associated Diagnoses: Status post repeat low transverse  section      ibuprofen (ADVIL,MOTRIN) 600 MG tablet Take 1 tablet (600 mg total) by mouth every 6 (six) hours.  Qty: 30 tablet, Refills: 0    Associated Diagnoses: Status post repeat low transverse  section         CONTINUE these medications which have NOT CHANGED    Details   ferrous sulfate 325 mg (65 mg iron) Tab tablet Take 1 tablet (325 mg total) by mouth once daily.  Qty: 30 tablet, Refills: 3    Associated Diagnoses: Anemia, unspecified type; Iron deficiency anemia, unspecified iron deficiency anemia type      PNV with calcium no.72-iron-FA 27 mg iron- 1 mg Tab Take 1 tablet by mouth once daily.             Alecia Arenas MD  Obstetrics  [facility]

## 2017-09-24 NOTE — PROGRESS NOTES
[facility]  Obstetrics  Postpartum Progress Note    Patient Name: Lauren Griggs  MRN: [id number]  Admission Date: 2017  Hospital Length of Stay: 3 days  Attending Physician: Khanh Baird III,*  Primary Care Provider: Primary Doctor No    Subjective:     Principal Problem:Status post repeat low transverse  section    Hospital course: HD # 1: Admitted as transfer for vaginal bleeding in setting of fetal anomaly. Plan for rLTCS with BTL  HD # 2: POD # 1 s/p RLTCS/BTL - Patient doing well. Infant in NICU. No complaints.  Making PO advances  HD # 3: POD # 2 s/p RLTCS/BTL - Patient doing well. Infant in NICU. No complaints.  Making PO advances    Interval History:     She is doing well this morning. She is tolerating a regular diet without nausea or vomiting. She is voiding spontaneously. She is ambulating. She has passed flatus, and has not a BM. Vaginal bleeding is mild. She denies fever or chills. Abdominal pain is mild and controlled with oral medications. She is not breastfeeding. Baby in NICU.    Objective:     Vital Signs (Most Recent):  Temp: 97.9 °F (36.6 °C) (17 0000)  Pulse: 85 (17 0000)  Resp: 18 (17 0000)  BP: 105/66 (17 0000)  SpO2: 96 % (17 0000) Vital Signs (24h Range):  Temp:  [97.9 °F (36.6 °C)-98.2 °F (36.8 °C)] 97.9 °F (36.6 °C)  Pulse:  [77-94] 85  Resp:  [18] 18  SpO2:  [96 %-97 %] 96 %  BP: (104-117)/(57-71) 105/66     Weight: 71.6 kg (157 lb 13.6 oz)  Body mass index is 28.86 kg/m².    No intake or output data in the 24 hours ending 17 0655    Significant Labs:  Lab Results   Component Value Date    GROUPTRH A POS 2017    HEPBSAG Negative 2017     No results for input(s): HGB, HCT in the last 48 hours.    Recent Labs  Lab 17  1115 17  0545   WBC 5.63 6.12   HGB 11.0* 9.4*   HCT 33.8* 29.1*   MCV 89 90    183      I have personallly reviewed all pertinent lab results from the last 24  hours.    Physical Exam:   Constitutional: She is oriented to person, place, and time. She appears well-developed and well-nourished. No distress.    HENT:   Head: Normocephalic and atraumatic.     Neck: Normal range of motion. Neck supple.    Cardiovascular: Normal rate and regular rhythm.     Pulmonary/Chest: Effort normal and breath sounds normal.        Abdominal: Soft. She exhibits abdominal incision (c/d/i). She exhibits no distension. There is no tenderness. There is no guarding.                 Neurological: She is alert and oriented to person, place, and time.    Skin: Skin is warm and dry.    Psychiatric: She has a normal mood and affect. Her behavior is normal.       Assessment/Plan:     31 y.o. female  for:    * Status post repeat low transverse  section    Postpartum care:  - Patient doing well. Continue routine management and advances.  - Will transition Pablo pain meds. Pain well controlled.  - Encourage ambulation.   - Heme: Pre Delivery h/h  --> Post Delivery h/h .  - Contraception - s/p BTL  - Lactation - The patient is not breastfeeding.   - Rh Status - A POS  - Will follow PO advances today            Rubella non-immune status, antepartum    - MMR PP        Truncus arteriosus, Wharton' type II    - Baby in NICU        Anemia    - Will start Fe and Colace            Disposition: As patient meets milestones, will plan to discharge POD#4.    Kae Chery MD  Obstetrics  [facility], [place], operated [facility]

## 2017-10-03 ENCOUNTER — PATIENT MESSAGE (OUTPATIENT)
Dept: OBSTETRICS AND GYNECOLOGY | Facility: CLINIC | Age: 32
End: 2017-10-03

## 2017-10-06 ENCOUNTER — PATIENT MESSAGE (OUTPATIENT)
Dept: OBSTETRICS AND GYNECOLOGY | Facility: CLINIC | Age: 32
End: 2017-10-06

## 2017-10-16 ENCOUNTER — CLINICAL SUPPORT (OUTPATIENT)
Dept: OBSTETRICS AND GYNECOLOGY | Facility: CLINIC | Age: 32
End: 2017-10-16
Payer: MEDICAID

## 2017-10-16 VITALS — TEMPERATURE: 99 F | DIASTOLIC BLOOD PRESSURE: 78 MMHG | SYSTOLIC BLOOD PRESSURE: 118 MMHG

## 2017-10-16 DIAGNOSIS — Z98.890 STATUS POST SURGERY: Primary | ICD-10-CM

## 2017-10-16 PROCEDURE — 99999 PR PBB SHADOW E&M-EST. PATIENT-LVL I: CPT | Mod: PBBFAC,,,

## 2017-10-16 PROCEDURE — 99211 OFF/OP EST MAY X REQ PHY/QHP: CPT | Mod: PBBFAC,PN

## 2017-11-13 ENCOUNTER — PATIENT MESSAGE (OUTPATIENT)
Dept: OBSTETRICS AND GYNECOLOGY | Facility: CLINIC | Age: 32
End: 2017-11-13

## 2018-04-13 ENCOUNTER — OFFICE VISIT (OUTPATIENT)
Dept: OBSTETRICS AND GYNECOLOGY | Facility: CLINIC | Age: 33
End: 2018-04-13
Payer: MEDICAID

## 2018-04-13 VITALS
HEIGHT: 62 IN | DIASTOLIC BLOOD PRESSURE: 89 MMHG | WEIGHT: 127 LBS | BODY MASS INDEX: 23.37 KG/M2 | SYSTOLIC BLOOD PRESSURE: 121 MMHG

## 2018-04-13 DIAGNOSIS — Z98.51 TUBAL LIGATION STATUS: ICD-10-CM

## 2018-04-13 PROBLEM — O46.90 VAGINAL BLEEDING IN PREGNANCY: Status: RESOLVED | Noted: 2017-09-21 | Resolved: 2018-04-13

## 2018-04-13 PROBLEM — Z34.92 ENCOUNTER FOR SUPERVISION OF NORMAL PREGNANCY IN SECOND TRIMESTER: Status: RESOLVED | Noted: 2017-05-02 | Resolved: 2018-04-13

## 2018-04-13 PROBLEM — D50.9 IRON DEFICIENCY ANEMIA: Status: RESOLVED | Noted: 2017-05-30 | Resolved: 2018-04-13

## 2018-04-13 PROBLEM — Z98.891 STATUS POST REPEAT LOW TRANSVERSE CESAREAN SECTION: Status: RESOLVED | Noted: 2017-09-21 | Resolved: 2018-04-13

## 2018-04-13 PROBLEM — D64.9 ANEMIA: Status: RESOLVED | Noted: 2017-05-02 | Resolved: 2018-04-13

## 2018-04-13 PROBLEM — D25.9 UTERINE FIBROID IN PREGNANCY: Status: RESOLVED | Noted: 2017-05-30 | Resolved: 2018-04-13

## 2018-04-13 PROBLEM — D82.1 DIGEORGE SYNDROME: Status: RESOLVED | Noted: 2017-06-28 | Resolved: 2018-04-13

## 2018-04-13 PROBLEM — Q20.0: Status: RESOLVED | Noted: 2017-07-11 | Resolved: 2018-04-13

## 2018-04-13 PROBLEM — O34.10 UTERINE FIBROID IN PREGNANCY: Status: RESOLVED | Noted: 2017-05-30 | Resolved: 2018-04-13

## 2018-04-13 PROBLEM — Z98.891 PREVIOUS CESAREAN SECTION: Status: RESOLVED | Noted: 2017-05-02 | Resolved: 2018-04-13

## 2018-04-13 PROBLEM — Z28.39 RUBELLA NON-IMMUNE STATUS, ANTEPARTUM: Status: RESOLVED | Noted: 2017-09-21 | Resolved: 2018-04-13

## 2018-04-13 PROBLEM — O09.899 RUBELLA NON-IMMUNE STATUS, ANTEPARTUM: Status: RESOLVED | Noted: 2017-09-21 | Resolved: 2018-04-13

## 2018-04-13 PROCEDURE — 99999 PR PBB SHADOW E&M-EST. PATIENT-LVL II: CPT | Mod: PBBFAC,,, | Performed by: OBSTETRICS & GYNECOLOGY

## 2018-04-13 PROCEDURE — 99212 OFFICE O/P EST SF 10 MIN: CPT | Mod: PBBFAC,PN | Performed by: OBSTETRICS & GYNECOLOGY

## 2018-04-13 PROCEDURE — 99213 OFFICE O/P EST LOW 20 MIN: CPT | Mod: S$PBB,,, | Performed by: OBSTETRICS & GYNECOLOGY

## 2018-04-13 RX ORDER — SULFAMETHOXAZOLE AND TRIMETHOPRIM 800; 160 MG/1; MG/1
TABLET ORAL
COMMUNITY
Start: 2018-03-26 | End: 2018-04-13

## 2018-04-13 RX ORDER — BUSPIRONE HYDROCHLORIDE 5 MG/1
TABLET ORAL
COMMUNITY
Start: 2018-03-01 | End: 2018-04-13

## 2018-04-13 RX ORDER — BUSPIRONE HYDROCHLORIDE 7.5 MG/1
TABLET ORAL
COMMUNITY
Start: 2018-04-02 | End: 2021-05-28

## 2018-04-13 RX ORDER — SERTRALINE HYDROCHLORIDE 25 MG/1
TABLET, FILM COATED ORAL
COMMUNITY
Start: 2018-03-01 | End: 2018-04-13

## 2018-04-13 RX ORDER — SERTRALINE HYDROCHLORIDE 50 MG/1
TABLET, FILM COATED ORAL
COMMUNITY
Start: 2018-04-02

## 2018-04-13 NOTE — PROGRESS NOTES
Subjective:       Patient ID: Lauren Griggs is a 32 y.o. female.    Chief Complaint:  Follow-up (tubaligation)      History of Present Illness  HPI  here for f/u   Reports infant was home for a week (after 6 months of being in the hospital) and the ; burried on 3/30/2018  Emotionally well--followed by dr mcconnell   Concerned wether tubes are tied    Currently on menses--but it was a wk late--heavy flow, worried    GYN & OB History  Patient's last menstrual period was 2018.   Date of Last Pap: 2017    OB History    Para Term  AB Living   3 3 3     3   SAB TAB Ectopic Multiple Live Births         0 3      # Outcome Date GA Lbr Beto/2nd Weight Sex Delivery Anes PTL Lv   3 Term 17 37w0d   M CS-LTranv Spinal N SONIA   2 Term     M CS-Unspec   SONIA   1 Term     F CS-Unspec   SONIA          Review of Systems  Review of Systems   Constitutional: Negative for activity change, appetite change, chills, diaphoresis, fatigue, fever and unexpected weight change.   HENT: Negative for mouth sores and tinnitus.    Eyes: Negative for discharge and visual disturbance.   Respiratory: Negative for cough, shortness of breath and wheezing.    Cardiovascular: Negative for chest pain, palpitations and leg swelling.   Gastrointestinal: Negative for abdominal pain, bloating, blood in stool, constipation, diarrhea, nausea and vomiting.   Endocrine: Negative for diabetes, hair loss, hot flashes, hyperthyroidism and hypothyroidism.   Genitourinary: Negative for decreased libido, dyspareunia, dysuria, flank pain, frequency, genital sores, hematuria, menorrhagia, menstrual problem, pelvic pain, urgency, vaginal bleeding, vaginal discharge, vaginal pain, dysmenorrhea, urinary incontinence, postcoital bleeding, postmenopausal bleeding and vaginal odor.   Musculoskeletal: Negative for back pain and myalgias.   Skin:  Negative for rash, no acne and hair changes.   Neurological: Negative for seizures, syncope, numbness  and headaches.   Hematological: Negative for adenopathy. Does not bruise/bleed easily.   Psychiatric/Behavioral: Negative for depression and sleep disturbance. The patient is not nervous/anxious.    Breast: Negative for breast mass, breast pain, nipple discharge and skin changes          Objective:    Physical Exam:   Constitutional: She appears well-developed.     Eyes: Conjunctivae and EOM are normal. Pupils are equal, round, and reactive to light.    Neck: Normal range of motion. Neck supple.     Pulmonary/Chest: Effort normal.        Abdominal: Soft.             Musculoskeletal: Normal range of motion.       Neurological: She is alert.    Skin: Skin is warm.    Psychiatric: She has a normal mood and affect.          Assessment:        Encounter Diagnosis   Name Primary?    Tubal ligation status                Plan:      Tubal ligaton path report reviewed with patient  Reviewed risk of ectopic  Continue to follow up with dr mcconnell--has had pastoral care  Aware ww exam due  Declines exam today

## 2018-05-25 ENCOUNTER — OFFICE VISIT (OUTPATIENT)
Dept: OBSTETRICS AND GYNECOLOGY | Facility: CLINIC | Age: 33
End: 2018-05-25
Payer: MEDICAID

## 2018-05-25 VITALS — BODY MASS INDEX: 22.71 KG/M2 | HEIGHT: 62 IN | WEIGHT: 123.44 LBS

## 2018-05-25 DIAGNOSIS — B37.31 YEAST VAGINITIS: ICD-10-CM

## 2018-05-25 DIAGNOSIS — Z12.4 SCREENING FOR CERVICAL CANCER: ICD-10-CM

## 2018-05-25 DIAGNOSIS — Z01.419 ENCOUNTER FOR GYNECOLOGICAL EXAMINATION (GENERAL) (ROUTINE) WITHOUT ABNORMAL FINDINGS: Primary | ICD-10-CM

## 2018-05-25 DIAGNOSIS — N89.8 VAGINAL DISCHARGE: ICD-10-CM

## 2018-05-25 PROCEDURE — 99999 PR PBB SHADOW E&M-EST. PATIENT-LVL II: CPT | Mod: PBBFAC,,, | Performed by: OBSTETRICS & GYNECOLOGY

## 2018-05-25 PROCEDURE — 99395 PREV VISIT EST AGE 18-39: CPT | Mod: S$PBB,,, | Performed by: OBSTETRICS & GYNECOLOGY

## 2018-05-25 PROCEDURE — 87480 CANDIDA DNA DIR PROBE: CPT

## 2018-05-25 PROCEDURE — 87510 GARDNER VAG DNA DIR PROBE: CPT

## 2018-05-25 PROCEDURE — 99212 OFFICE O/P EST SF 10 MIN: CPT | Mod: PBBFAC,PN | Performed by: OBSTETRICS & GYNECOLOGY

## 2018-05-25 RX ORDER — FLUCONAZOLE 200 MG/1
200 TABLET ORAL DAILY
Qty: 3 TABLET | Refills: 0 | Status: SHIPPED | OUTPATIENT
Start: 2018-05-25 | End: 2018-05-28

## 2018-05-25 NOTE — PROGRESS NOTES
Subjective:       Patient ID: Lauren Griggs is a 32 y.o. female.    Chief Complaint:  Well Woman      History of Present Illness  HPI  Annual Exam-Premenopausal  Patient presents for annual exam. The patient has no complaints today. The patient is sexually active--tl for contraception;denies pelvic pain; . GYN screening history: last pap: approximate date 2017 and was normal. The patient wears seatbelts: yes. The patient participates in regular exercise: no. Has the patient ever been transfused or tattooed?: no . The patient reports that there is not domestic violence in her life.      Menses monthly, fut feels it is random; flow 3 day; pads--super long; change q 2 hrs; no double up; mod dysmenorrhea radiates to thighs--tylenol/advil     C/o vulvar irritation; used monistat for 7 days    GYN & OB History  Patient's last menstrual period was 2018.   Date of Last Pap: 2017    OB History    Para Term  AB Living   3 3 3     2   SAB TAB Ectopic Multiple Live Births         0 3      # Outcome Date GA Lbr Beto/2nd Weight Sex Delivery Anes PTL Lv   3 Term 17 37w0d   M CS-LTranv Spinal N DEC   2 Term     M CS-Unspec   SONIA   1 Term     F CS-Unspec   SONIA          Review of Systems  Review of Systems   Constitutional: Negative for activity change, appetite change, chills, diaphoresis, fatigue, fever and unexpected weight change.   HENT: Negative for mouth sores and tinnitus.    Eyes: Negative for discharge and visual disturbance.   Respiratory: Negative for cough, shortness of breath and wheezing.    Cardiovascular: Negative for chest pain, palpitations and leg swelling.   Gastrointestinal: Negative for abdominal pain, bloating, blood in stool, constipation, diarrhea, nausea and vomiting.   Endocrine: Negative for diabetes, hair loss, hot flashes, hyperthyroidism and hypothyroidism.   Genitourinary: Positive for vaginal discharge. Negative for decreased libido, dyspareunia, dysuria, flank  pain, frequency, genital sores, hematuria, menorrhagia, menstrual problem, pelvic pain, urgency, vaginal bleeding, vaginal pain, dysmenorrhea, urinary incontinence, postcoital bleeding, postmenopausal bleeding and vaginal odor.   Musculoskeletal: Negative for back pain and myalgias.   Skin:  Negative for rash, no acne and hair changes.   Neurological: Negative for seizures, syncope, numbness and headaches.   Hematological: Negative for adenopathy. Does not bruise/bleed easily.   Psychiatric/Behavioral: Negative for depression and sleep disturbance. The patient is not nervous/anxious.    Breast: Negative for breast mass, breast pain, nipple discharge and skin changes          Objective:    Physical Exam:   Constitutional: She appears well-developed.     Eyes: Conjunctivae and EOM are normal. Pupils are equal, round, and reactive to light.    Neck: Normal range of motion. Neck supple.     Pulmonary/Chest: Effort normal. Right breast exhibits no mass, no nipple discharge, no skin change and no tenderness. Left breast exhibits no mass, no nipple discharge, no skin change and no tenderness. Breasts are symmetrical.        Abdominal: Soft.     Genitourinary: Rectum normal and uterus normal. Pelvic exam was performed with patient supine. Cervix is normal. Right adnexum displays no mass and no tenderness. Left adnexum displays no mass and no tenderness. No erythema, bleeding, rectocele, cystocele or unspecified prolapse of vaginal walls in the vagina. Vaginal discharge (white) found. Labial bartholins normal.       Uterus Size: 6 cm   Musculoskeletal: Normal range of motion.       Neurological: She is alert.    Skin: Skin is warm.    Psychiatric: She has a normal mood and affect.          Assessment:     Encounter Diagnoses   Name Primary?    Yeast vaginitis     Vaginal discharge     Encounter for gynecological examination (general) (routine) without abnormal findings Yes    Screening for cervical cancer                 Plan:      Continue annual well woman exam.  Pap 2017 wnl due in 2020  Affirm today; advised gentle skin cleansing  Suspect yeast; rx sent for diflucan  Safe sex  Continue diet, exercise, weight loss

## 2018-05-26 LAB
CANDIDA RRNA VAG QL PROBE: NEGATIVE
G VAGINALIS RRNA GENITAL QL PROBE: NEGATIVE
T VAGINALIS RRNA GENITAL QL PROBE: NEGATIVE

## 2019-07-25 NOTE — SUBJECTIVE & OBJECTIVE
Obstetric HPI:  Patient reports some cramping, active fetal movement, Yes vaginal bleeding , No loss of fluid     This pregnancy has been complicated by previous c/s X 2, baby with cardiac defect-truncus arteriosis and needs to deliver in [place].    Obstetric History       T2      L2     SAB0   TAB0   Ectopic0   Multiple0   Live Births2       # Outcome Date GA Lbr Beto/2nd Weight Sex Delivery Anes PTL Lv   3 Current            2 Term     M CS-Unspec   SONIA   1 Term     F CS-Unspec   SONIA        Past Medical History:   Diagnosis Date    Abnormal Pap smear of cervix      Past Surgical History:   Procedure Laterality Date     SECTION         PTA Medications   Medication Sig    ferrous sulfate 325 mg (65 mg iron) Tab tablet Take 1 tablet (325 mg total) by mouth once daily.    PNV with calcium no.72-iron-FA 27 mg iron- 1 mg Tab Take 1 tablet by mouth once daily.       Review of patient's allergies indicates:  No Known Allergies     Family History     None        Social History Main Topics    Smoking status: Never Smoker    Smokeless tobacco: Never Used    Alcohol use No      Comment: socally     Drug use: No    Sexual activity: Yes     Partners: Male     Review of Systems   Objective:     Vital Signs (Most Recent):    Vital Signs (24h Range):  BP: (121)/(62) 121/62        There is no height or weight on file to calculate BMI.    FHT: Cat 1 (reassuring) 140s with accels, no decels  TOCO: Q 3-7 min    Physical Exam:   Constitutional: She is oriented to person, place, and time. She appears well-developed and well-nourished.      Neck: Normal range of motion.     Pulmonary/Chest: Effort normal.        Abdominal: Soft.     Genitourinary:   Genitourinary Comments: + bright red bleeding, small amount noted in vaginal vault and on exam glove           Musculoskeletal: Normal range of motion.       Neurological: She is alert and oriented to person, place, and time. She has normal reflexes.     Skin: Skin is warm and dry.    Psychiatric: She has a normal mood and affect. Her behavior is normal.       Cervix:   Dilation:  0  Effacement:  60  Station: -3  Presentation: Vertex     Significant Labs:  Lab Results   Component Value Date    GROUPTRH A POS 03/09/2017    HEPBSAG Negative 03/09/2017     Labs and US pending.    Adequate: hears normal conversation without difficulty

## 2021-05-19 ENCOUNTER — TELEPHONE (OUTPATIENT)
Dept: OBSTETRICS AND GYNECOLOGY | Facility: CLINIC | Age: 36
End: 2021-05-19

## 2021-05-28 ENCOUNTER — OFFICE VISIT (OUTPATIENT)
Dept: OBSTETRICS AND GYNECOLOGY | Facility: CLINIC | Age: 36
End: 2021-05-28
Payer: MEDICAID

## 2021-05-28 ENCOUNTER — LAB VISIT (OUTPATIENT)
Dept: LAB | Facility: HOSPITAL | Age: 36
End: 2021-05-28
Attending: OBSTETRICS & GYNECOLOGY
Payer: MEDICAID

## 2021-05-28 VITALS
BODY MASS INDEX: 25.84 KG/M2 | SYSTOLIC BLOOD PRESSURE: 102 MMHG | WEIGHT: 140.44 LBS | DIASTOLIC BLOOD PRESSURE: 74 MMHG | HEIGHT: 62 IN

## 2021-05-28 DIAGNOSIS — N89.8 VAGINAL DISCHARGE: ICD-10-CM

## 2021-05-28 DIAGNOSIS — R53.82 CHRONIC FATIGUE: ICD-10-CM

## 2021-05-28 DIAGNOSIS — Z01.419 ENCOUNTER FOR GYNECOLOGICAL EXAMINATION (GENERAL) (ROUTINE) WITHOUT ABNORMAL FINDINGS: Primary | ICD-10-CM

## 2021-05-28 DIAGNOSIS — Z12.4 SCREENING FOR CERVICAL CANCER: ICD-10-CM

## 2021-05-28 LAB
ERYTHROCYTE [DISTWIDTH] IN BLOOD BY AUTOMATED COUNT: 13.5 % (ref 11.5–14.5)
HCT VFR BLD AUTO: 37.8 % (ref 37–48.5)
HGB BLD-MCNC: 12 G/DL (ref 12–16)
MCH RBC QN AUTO: 30 PG (ref 27–31)
MCHC RBC AUTO-ENTMCNC: 31.7 G/DL (ref 32–36)
MCV RBC AUTO: 95 FL (ref 82–98)
PLATELET # BLD AUTO: 196 K/UL (ref 150–450)
PMV BLD AUTO: 12 FL (ref 9.2–12.9)
RBC # BLD AUTO: 4 M/UL (ref 4–5.4)
T3FREE SERPL-MCNC: 2.6 PG/ML (ref 2.3–4.2)
T4 SERPL-MCNC: 5.8 UG/DL (ref 4.5–11.5)
THYROPEROXIDASE IGG SERPL-ACNC: <6 IU/ML
TSH SERPL DL<=0.005 MIU/L-ACNC: 1.88 UIU/ML (ref 0.4–4)
WBC # BLD AUTO: 4.18 K/UL (ref 3.9–12.7)

## 2021-05-28 PROCEDURE — 87661 TRICHOMONAS VAGINALIS AMPLIF: CPT | Mod: 59 | Performed by: OBSTETRICS & GYNECOLOGY

## 2021-05-28 PROCEDURE — 88175 CYTOPATH C/V AUTO FLUID REDO: CPT | Performed by: OBSTETRICS & GYNECOLOGY

## 2021-05-28 PROCEDURE — 99999 PR PBB SHADOW E&M-EST. PATIENT-LVL III: CPT | Mod: PBBFAC,,, | Performed by: OBSTETRICS & GYNECOLOGY

## 2021-05-28 PROCEDURE — 99385 PREV VISIT NEW AGE 18-39: CPT | Mod: S$PBB,,, | Performed by: OBSTETRICS & GYNECOLOGY

## 2021-05-28 PROCEDURE — 84481 FREE ASSAY (FT-3): CPT | Performed by: OBSTETRICS & GYNECOLOGY

## 2021-05-28 PROCEDURE — 99213 OFFICE O/P EST LOW 20 MIN: CPT | Mod: PBBFAC,PN | Performed by: OBSTETRICS & GYNECOLOGY

## 2021-05-28 PROCEDURE — 87481 CANDIDA DNA AMP PROBE: CPT | Mod: 59 | Performed by: OBSTETRICS & GYNECOLOGY

## 2021-05-28 PROCEDURE — 84443 ASSAY THYROID STIM HORMONE: CPT | Performed by: OBSTETRICS & GYNECOLOGY

## 2021-05-28 PROCEDURE — 99385 PR PREVENTIVE VISIT,NEW,18-39: ICD-10-PCS | Mod: S$PBB,,, | Performed by: OBSTETRICS & GYNECOLOGY

## 2021-05-28 PROCEDURE — 84436 ASSAY OF TOTAL THYROXINE: CPT | Performed by: OBSTETRICS & GYNECOLOGY

## 2021-05-28 PROCEDURE — 87624 HPV HI-RISK TYP POOLED RSLT: CPT | Performed by: OBSTETRICS & GYNECOLOGY

## 2021-05-28 PROCEDURE — 99999 PR PBB SHADOW E&M-EST. PATIENT-LVL III: ICD-10-PCS | Mod: PBBFAC,,, | Performed by: OBSTETRICS & GYNECOLOGY

## 2021-05-28 PROCEDURE — 36415 COLL VENOUS BLD VENIPUNCTURE: CPT | Mod: PO | Performed by: OBSTETRICS & GYNECOLOGY

## 2021-05-28 PROCEDURE — 86376 MICROSOMAL ANTIBODY EACH: CPT | Performed by: OBSTETRICS & GYNECOLOGY

## 2021-05-28 PROCEDURE — 85027 COMPLETE CBC AUTOMATED: CPT | Performed by: OBSTETRICS & GYNECOLOGY

## 2021-05-28 RX ORDER — FERROUS SULFATE TAB 325 MG (65 MG ELEMENTAL FE) 325 (65 FE) MG
1 TAB ORAL 3 TIMES DAILY
COMMUNITY
Start: 2021-04-19

## 2021-05-31 LAB
BACTERIAL VAGINOSIS DNA: POSITIVE
CANDIDA GLABRATA DNA: NEGATIVE
CANDIDA KRUSEI DNA: NEGATIVE
CANDIDA RRNA VAG QL PROBE: NEGATIVE
T VAGINALIS RRNA GENITAL QL PROBE: NEGATIVE

## 2021-06-01 DIAGNOSIS — B96.89 BACTERIAL VAGINOSIS: Primary | ICD-10-CM

## 2021-06-01 DIAGNOSIS — N76.0 BACTERIAL VAGINOSIS: Primary | ICD-10-CM

## 2021-06-01 RX ORDER — METRONIDAZOLE 500 MG/1
500 TABLET ORAL EVERY 12 HOURS
Qty: 14 TABLET | Refills: 0 | Status: SHIPPED | OUTPATIENT
Start: 2021-06-01 | End: 2021-06-08

## 2021-06-02 LAB
FINAL PATHOLOGIC DIAGNOSIS: NORMAL
HPV HR 12 DNA SPEC QL NAA+PROBE: NEGATIVE
HPV16 AG SPEC QL: NEGATIVE
HPV18 DNA SPEC QL NAA+PROBE: NEGATIVE
Lab: NORMAL

## 2023-06-26 NOTE — TELEPHONE ENCOUNTER
----- Message from Americo Jung MD sent at 9/7/2017  3:38 PM CDT -----  Hi Dr. Garcia,    I saw Lauren today. Overall doing well. Fetal growth is at 11% and given that with cardiac defect I think she needs weekly BPP. We did one today, but would like to see if you can arrange for those for the next few weeks. We have her scheduled for delivery on 10/5 but if anything happens or changes sooner, please let me know.    Thanks    Ambrose Jung   ----- Message from Celestina Salas sent at 6/26/2023  2:16 PM CDT -----  Contact: [phone]  Type:  Sooner Apoointment Request    Caller is requesting a sooner appointment.  Caller declined first available appointment listed below.  Caller will not accept being placed on the waitlist and is requesting a message be sent to doctor.  Name of Caller:Guanaco   When is the first available appointment?7/5   Symptoms: check up   Would the patient rather a call back or a response via [facility]? Call back   Best Call Back Number:[phone]  Additional Information:

## 2023-10-23 NOTE — PROGRESS NOTES
"Anatomy scan today with good growth noted @ 33%tile, 3V cord, devin normal, posterior placenta. . Sub-opt view of spine. Noted 43 mm intramural fibroid.    Will do f/u with MFM/ BB clinic to evaluate and 2/2 to h/o prior baby with DeGeorge syndrome.   Had some cramping and brown discharge several days ago that is now improved. Seen  @ [facility] over w/e and "everything was ok and I was not dilated".  Patient educated on fibroid and that some degeneration may be occurring as s/s consistent with same. To call office immediately if begins bright red and/or cramping resumes.   Continue po hydration and continue with iron pills as taking daily at present.   Baby active.  Initiated discussion re: co-effective hailee/ BF vs Bottle.   Adamant re desire to Bottle feed only  Coffective counseling sheet Build Your Team discussed with mother. Reinforced importance of early identification of support team including champion, OB provider, pediatrician and local community resources. Encouraged mother to download Coffective mobile hailee if she has not already done so.  Mother verbalizes understanding.    F/u with MFM as above in 4 weeks.  RTC with us in 6 wks.         " [id number]  Smoking status: current, educated on risks, patient not interested in stopping at this time  Pulmonary rehabilitation:   Referral: consider pulmonary referral if FEV1< 50% predicted    - Diabetes:   Glycemic goal: <7.0% and directed by provider. Is at blood glucose goal.  Current symptoms/problems: none  Home blood sugar records:  does not test, does not have a glucometer  Any episodes of hypoglycemia: does not test  Current testing supplies/frequency: does not have glucometer, is not required to check by pcp  Therapy optimization: currently at goal, states has been at goal a long time just on one metformin  Diet/exercise: hardly eats any sugar or fried food. Drinks 1 to 3 pots of coffee per day, 12 cups of coffee per pot.  No exercise    - Upcoming appointments:   Future Appointments   Date Time Provider [address] [place] [address]   10/26/2023  2:40 PM Ricardo Stapleton MD [facility]       Robert Smith, PharmD, [address] [place] [address] Pharmacist  Department: [phone]    For Pharmacy Admin Tracking Only    Program: Yumiko in place:  No  Recommendation Provided To: Provider: 2 via Note to Provider  Intervention Detail: Lab(s) Ordered  Intervention Accepted By: Provider: 2  Gap Closed?: Yes   Time Spent (min): 30